# Patient Record
Sex: MALE | Race: WHITE | NOT HISPANIC OR LATINO | Employment: FULL TIME | URBAN - METROPOLITAN AREA
[De-identification: names, ages, dates, MRNs, and addresses within clinical notes are randomized per-mention and may not be internally consistent; named-entity substitution may affect disease eponyms.]

---

## 2018-10-31 ENCOUNTER — OFFICE VISIT - HEALTHEAST (OUTPATIENT)
Dept: FAMILY MEDICINE | Facility: CLINIC | Age: 23
End: 2018-10-31

## 2018-10-31 ENCOUNTER — COMMUNICATION - HEALTHEAST (OUTPATIENT)
Dept: TELEHEALTH | Facility: CLINIC | Age: 23
End: 2018-10-31

## 2018-10-31 DIAGNOSIS — M54.9 BACK PAIN: ICD-10-CM

## 2018-10-31 RX ORDER — NAPROXEN 500 MG/1
500 TABLET ORAL 2 TIMES DAILY WITH MEALS
Qty: 30 TABLET | Refills: 2 | Status: SHIPPED | OUTPATIENT
Start: 2018-10-31 | End: 2023-02-14

## 2018-11-06 ENCOUNTER — OFFICE VISIT - HEALTHEAST (OUTPATIENT)
Dept: FAMILY MEDICINE | Facility: CLINIC | Age: 23
End: 2018-11-06

## 2018-11-06 DIAGNOSIS — M62.830 SPASM OF THORACIC BACK MUSCLE: ICD-10-CM

## 2018-11-08 ENCOUNTER — OFFICE VISIT - HEALTHEAST (OUTPATIENT)
Dept: PHYSICAL THERAPY | Facility: REHABILITATION | Age: 23
End: 2018-11-08

## 2018-11-08 DIAGNOSIS — M54.6 ACUTE BILATERAL THORACIC BACK PAIN: ICD-10-CM

## 2018-11-14 ENCOUNTER — OFFICE VISIT - HEALTHEAST (OUTPATIENT)
Dept: PHYSICAL THERAPY | Facility: REHABILITATION | Age: 23
End: 2018-11-14

## 2018-11-14 DIAGNOSIS — M54.6 ACUTE BILATERAL THORACIC BACK PAIN: ICD-10-CM

## 2018-12-04 ENCOUNTER — OFFICE VISIT - HEALTHEAST (OUTPATIENT)
Dept: PHYSICAL THERAPY | Facility: REHABILITATION | Age: 23
End: 2018-12-04

## 2018-12-04 DIAGNOSIS — M54.6 ACUTE BILATERAL THORACIC BACK PAIN: ICD-10-CM

## 2018-12-07 ENCOUNTER — OFFICE VISIT - HEALTHEAST (OUTPATIENT)
Dept: PHYSICAL THERAPY | Facility: REHABILITATION | Age: 23
End: 2018-12-07

## 2018-12-07 DIAGNOSIS — M54.6 ACUTE BILATERAL THORACIC BACK PAIN: ICD-10-CM

## 2018-12-10 ENCOUNTER — OFFICE VISIT - HEALTHEAST (OUTPATIENT)
Dept: PHYSICAL THERAPY | Facility: REHABILITATION | Age: 23
End: 2018-12-10

## 2018-12-10 DIAGNOSIS — M54.6 ACUTE BILATERAL THORACIC BACK PAIN: ICD-10-CM

## 2018-12-18 ENCOUNTER — OFFICE VISIT - HEALTHEAST (OUTPATIENT)
Dept: PHYSICAL THERAPY | Facility: REHABILITATION | Age: 23
End: 2018-12-18

## 2018-12-18 DIAGNOSIS — M54.6 ACUTE BILATERAL THORACIC BACK PAIN: ICD-10-CM

## 2018-12-20 ENCOUNTER — OFFICE VISIT - HEALTHEAST (OUTPATIENT)
Dept: PHYSICAL THERAPY | Facility: REHABILITATION | Age: 23
End: 2018-12-20

## 2018-12-20 DIAGNOSIS — M54.6 ACUTE BILATERAL THORACIC BACK PAIN: ICD-10-CM

## 2021-06-02 VITALS — WEIGHT: 163.1 LBS

## 2021-06-02 VITALS — WEIGHT: 159.3 LBS

## 2021-06-21 NOTE — PROGRESS NOTES
Assessment/Plan:     1. Spasm of thoracic back muscle  No neurological symptoms.  Likely muscular.  Will refer to PT.  Continue Naproxen twice daily and Tizanidine four times a day as needed.   - Ambulatory referral to Adult PT- Internal  - tiZANidine (ZANAFLEX) 4 MG tablet; Take 1 tablet (4 mg total) by mouth every 6 (six) hours as needed.  Dispense: 30 tablet; Refill: 1        Subjective:     Amarjit Lin is a 23 y.o. male who presents for follow up regarding recent [facility] visit.  Patient was seen in [facility] 1 week ago with thoracic back pain.  Symptoms started about 6 weeks ago.  He recently started a new job at [employer].  The job is very physical.  Patient endorses muscle spasm and soreness to the bilateral thoracic back.  He gets intermittent sharp/pinching sensations.  Denies any pain at the actual spine.  No recent falls or injury.  He has recently been feeling pretty good in the morning, but muscle pain worsens as the day goes on.  No previous injury or back issues.  Patient was evaluated at walk-in [facility] and prescribed naproxen and tizanidine.  Symptoms have been better since last week.  He did see a physical therapist through his job about a month ago, and it was somewhat helpful.  He would be interested in seeing another physical therapist.  Patient denies any problems with urinary incontinence, radiation of pain down his legs, saddle anesthesia, fever, or numbness/tingling in the legs/feet.      The following portions of the patient's history were reviewed and updated as appropriate: allergies, current medications.    Review of Systems  A comprehensive review of systems was performed and was otherwise negative    Objective:     /72 (Patient Site: Right Arm, Patient Position: Sitting, Cuff Size: Adult Regular)  Pulse 78  Wt 163 lb 1.6 oz (74 kg)    General Appearance: Alert, cooperative, no distress, appears stated age  Back: Spine is straight and nontender. Mild tenderness to the bilateral  thoracic paraspinal muscles. LE strength equal bilaterally. SLR negative bilaterally. Patellar reflexes 2/4 bilaterally. Normal ROM.    Qing Carbajal, NP-C

## 2021-06-21 NOTE — PROGRESS NOTES
[facility]   Cervical Thoracic Initial Evaluation    Patient Name: Amarjit Lin  Date of evaluation: 11/8/2018  Referral Diagnosis: Spasm of thoracic back muscle  Referring provider: Qing Carbajal NP  Visit Diagnosis:     ICD-10-CM    1. Acute bilateral thoracic back pain M54.6        Assessment:      Impairments in  pain, posture, ROM, joint mobility, strength  Patient's signs and symptoms are consistent with medical diagnosis.  Patient responded well to manual therapy and exercises..  Prognosis to achieve goals is  good   Pt. is appropriate for skilled PT intervention as outlined in the Plan of Care (POC).    Goals:  Pt. will demonstrate/verbalize independence in self-management of condition in : 6 weeks  Pt. will be independent with home exercise program in : 6 weeks  Pt. will report decreased intensity, frequency of : Pain;in 6 weeks  Patient will stand : for work;with no pain;with less difficultty;in 6 weeks  Pt. will bend: for work;with less pain;with less difficulty;in 6 weeks    No Data Recorded    Patient's expectations/goals are realistic.    Barriers to Learning or Achieving Goals:  No Barriers.       Plan / Patient Instructions:        Plan of Care:   Communication with: Referral Source  Patient Related Instruction: Nature of Condition;Precautions;Next steps;Treatment plan and rationale;Expected outcome;Self Care instruction;Basis of treatment;Body mechanics;Posture  Times per Week: 2-1  Number of Weeks: 4-6  Number of Visits: 8  Discharge Planning: to include self management strategies and HEP  Precautions / Restrictions : none  Therapeutic Exercise: ROM;Stretching;Strengthening  Neuromuscular Reeducation: posture;core  Manual Therapy: soft tissue mobilization;myofascial release;joint mobilization  Modalities: cold pack;hot pack (trials as appropriate)      POC and pathology of condition were reviewed with patient.  Pt. is in agreement with the Plan of Care  A Home Exercise Program  (HEP) was initiated today.  Pt. was instructed in exercises by PT and patient was given a handout with detailed instructions.      Plan for next visit: review HEP, continue manual therapy, progress to core strengthening     Subjective:           History of Present Illness:    Amarjit is a 23 y.o. male who presents to therapy today with complaints of thoracolumbar back pain. Date of onset:  2018. Onset was gradual. Symptoms are intermittent. He denies history of similar symptoms. He describes their previous level of function as not limited.    Pain Ratin  Pain rating at best: 0  Pain rating at worst: 7  Pain description: aching, dull, pain and soreness    Functional limitations are described as occurring with:   bending  standing    walking           Objective:      Note: Items left blank indicates the item was not performed or not indicated at the time of the evaluation.    Patient Outcome Measures :    No Data Recorded   Scores range from 0-100%, where a score of 0% represents minimal pain and maximal function. The minmal clinically important difference is a score reduction of 10%.    Cervical Thoracic Examination  1. Acute bilateral thoracic back pain       Precautions/Restrictions: None  Involved side: Bilateral  Posture Observation:      Cervical:  Mild forward head  Shoulder/Thoracic complex: Moderate bilateral scapular protraction     Cervical ROM:    Date: 18     *Indicate scale AROM AROM AROM   Cervical Flexion WNL     Cervical Extension WNL      Right Left Right Left Right Left   Cervical Sidebending WNL WNL       Cervical Rotation WNL wNL       Cervical Protraction WNL     Cervical Retraction Min loss     Thoracic Flexion min     Thoracic Extension mod     Thoracic Sidebending mod mod       Thoracic Rotation min min         Strength     Date: 18     Cervical Myotomes/5 Right Left Right Left Right Left   Cervical Flexion (C1-2) 5 5       Cervical Sidebending (C3) 5 5       Shoulder Elevation  "(C4) 5 5       Shoulder Abduction (C5) 5 5       Elbow Flexion (C6) 5 5       Elbow Extension (C7) 5 5       Wrist Flexion (C7) 5 5       Wrist Extension (C6) 5 5       Thumb abduction (C8) 5 5       Finger Abduction (T1) 5 5         Sensation   NT      Reflex Testing  NT  Cervical Dermatomes Right Left UE Reflexes Right Left   Back of the Head (C2)   Biceps (C5-6)     Supraclavicular Fossa (C3)   Brachioradialis (C5-6)     AC Joint (C4)   Triceps (C7-8)     Lateral Biceps (C5)   Miles s test     Palmar Thumb (C6)   LE Reflexes     Palmar 3rd Finger (C7)   Patellar (L3-4)     Palmar 5th Finger (C8)   Achilles (S1-2)     Ulnar Forearm (T1)   Babinski Response           Palpation:  Tender bilateral thoracic paraspinals.    Passive Mobility-Joint Integrity: Hypomobile.    Cervical Special Tests      Cervical Special Tests Right Left UE Nerve Mobility Right Left   Cervical compression   Median nerve     Cervical distraction   Ulnar nerve     Spurling s test   Radial nerve     Shoulder abduction sign   Thoracic outlet     Deep neck flexor endurance test   Mraen     Upper cervical rotation   Adson s     Sharper-Maren   Cervical rotation lateral flexion     Alar ligament test   Other:     Other:   Other:           Treatment Today     Exercises:  Exercise #1: pec stretch  Comment #1: 30\" x 2   Exercise #2: thoracic extension   Comment #2: 10  Exercise #3: cervical retraction x 5  Comment #3: scapular retraction x 5    .   MFR layers 1-3 bilateral thoracic paraspinals, trapezius       TREATMENT MINUTES COMMENTS   Evaluation 25    Self-care/ Home management     Manual therapy 20    Neuromuscular Re-education     Therapeutic Activity     Therapeutic Exercises 10    Gait training     Modality__________________                Total 55    Blank areas are intentional and mean the treatment did not include these items.     PT Evaluation Code: (Please list factors)  Patient History/Comorbidities: There is no problem list on file " for this patient.   No past medical history on file.   Examination: as above  Clinical Presentation: stable  Clinical Decision Making: low    Patient History/  Comorbidities Examination  (body structures and functions, activity limitations, and/or participation restrictions) Clinical Presentation Clinical Decision Making (Complexity)   No documented Comorbidities or personal factors 1-2 Elements Stable and/or uncomplicated Low   1-2 documented comorbidities or personal factor 3 Elements Evolving clinical presentation with changing characteristics Moderate   3-4 documented comorbidities or personal factors 4 or more Unstable and unpredictable High               Armando Pryor, PT  11/8/2018  8:49 AM

## 2021-06-21 NOTE — PROGRESS NOTES
"[facility] Daily Progress     Patient Name: Amarjit Lin  Date: 11/14/2018  Visit #: 2/8  Referral Diagnosis:  Spasm of thoracic back muscle  Referring provider: Qing Carbajal NP  Visit Diagnosis:     ICD-10-CM    1. Acute bilateral thoracic back pain M54.6        Precautions / Restrictions : none       Assessment:     Response to Intervention:  Very good.  Significant improvement of symptoms overall.  Patient is ready for a trial of independent self management.    Symptoms are consistent with:  Medical diagnosis  Patient is appropriate to continue with skilled physical therapy intervention, as indicated by initial plan of care.    Goal Status:  Pt. will demonstrate/verbalize independence in self-management of condition in : 6 weeks  Pt. will be independent with home exercise program in : 6 weeks  Pt. will report decreased intensity, frequency of : Pain;in 6 weeks  Patient will stand : for work;with no pain;with less difficultty;in 6 weeks  Pt. will bend: for work;with less pain;with less difficulty;in 6 weeks    No Data Recorded  Other functional progress:           Plan / Patient Education:     Trial of independent self-management of condition initiated.  Patient to contact PT by phone or schedule an appointment as needed if symptoms increase or progress stops.  If patient has not returned to continue therapy in 30 days then physical therapy will be discharged.        Subjective:     Pain Rating:  Resting 0  Activity:  0    Response to last treatment: a little sore the next day, but better now  HEP- Frequency: 2x/day, Questions or difficulties:  none.    Patient reports:      Feeling a lot better.    Able to do household tasks with less pain and spams      Objective:            Manual Therapy  MFR layers 1-3 bilateral thoracic and lumbar paraspinals    Exercises:  Exercise #1: pec stretch  Comment #1: 30\" x 2   Exercise #2: thoracic extension   Comment #2: 10  Exercise #3: cervical retraction x " 5  Comment #3: scapular retraction x 5         Treatment Today    TREATMENT MINUTES COMMENTS   Evaluation     Self-care/ Home management     Manual therapy 25 See above.   Neuromuscular Re-education     Therapeutic Activity     Therapeutic Exercises 5 Verbal review of HEP   Gait training     Modality__________________                Total 30    Blank areas are intentional and mean the treatment did not include these items.       Armando Pryor, PT  11/14/2018

## 2021-06-22 NOTE — PROGRESS NOTES
"[facility] Daily Progress     Patient Name: Amarjit Lin  Date: 12/7/2018  Visit #: 4/8  Referral Diagnosis:  Spasm of thoracic back muscle  Referring provider: Qing Carbajal NP  Visit Diagnosis:     ICD-10-CM    1. Acute bilateral thoracic back pain M54.6        Precautions / Restrictions : none       Assessment:     Response to Intervention:  Very good.  Significant improvement of symptoms overall.      Symptoms are consistent with:  Medical diagnosis  Patient is appropriate to continue with skilled physical therapy intervention, as indicated by initial plan of care.    Goal Status:  Pt. will demonstrate/verbalize independence in self-management of condition in : 6 weeks  Pt. will be independent with home exercise program in : 6 weeks  Pt. will report decreased intensity, frequency of : Pain;in 6 weeks  Patient will stand : for work;with no pain;with less difficultty;in 6 weeks  Pt. will bend: for work;with less pain;with less difficulty;in 6 weeks    No Data Recorded  Other functional progress:           Plan / Patient Education:     Continue with POC       Subjective:     Pain Rating:  Resting 2  Activity:  3    Response to last treatment: a little sore the next day, but better now  HEP- Frequency: 2x/day, Questions or difficulties:  none.    Patient reports:      Feeling better, but still having some sharp pain.      Objective:            Manual Therapy  MFR layers 1-3 bilateral thoracic and lumbar paraspinals    Exercises:  Exercise #1: pec stretch  Comment #1: 30\" x 2   Exercise #2: thoracic extension   Comment #2: 10  Exercise #3: cervical retraction x 5  Comment #3: scapular retraction x 5  Exercise #4: manual stretching quad, hip flexors,   Comment #4: 30\" x 2 bilateral  Exercise #5: quad stretch  Comment #5: 30\"  Exercise #6: supine hamstring nerve glide  Comment #6: 10  Exercise #7: 1/2 kneel or supine hip flexor stretch for HEP  Exercise #8: piriformis stretch for HEP       Discussion " of srinivasa zhu janel chair      Treatment Today    TREATMENT MINUTES COMMENTS   Evaluation     Self-care/ Home management     Manual therapy 30    Neuromuscular Re-education     Therapeutic Activity     Therapeutic Exercises 5    Gait training     Modality__________________                Total 35    Blank areas are intentional and mean the treatment did not include these items.       Armando Pryor, PT  12/7/2018

## 2021-06-22 NOTE — PROGRESS NOTES
"[facility] Daily Progress     Patient Name: Amarjit Lin  Date: 12/10/2018  Visit #: 5/8  Referral Diagnosis:  Spasm of thoracic back muscle  Referring provider: Qing Carbajal NP  Visit Diagnosis:     ICD-10-CM    1. Acute bilateral thoracic back pain M54.6        Precautions / Restrictions : none       Assessment:     Response to Intervention:  Very good.  Significant improvement of symptoms overall.      Symptoms are consistent with:  Medical diagnosis  Patient is appropriate to continue with skilled physical therapy intervention, as indicated by initial plan of care.    Goal Status:  Pt. will demonstrate/verbalize independence in self-management of condition in : 6 weeks  Pt. will be independent with home exercise program in : 6 weeks  Pt. will report decreased intensity, frequency of : Pain;in 6 weeks  Patient will stand : for work;with no pain;with less difficultty;in 6 weeks  Pt. will bend: for work;with less pain;with less difficulty;in 6 weeks    No Data Recorded  Other functional progress:           Plan / Patient Education:     Continue with POC       Subjective:     Pain Rating:  Resting 2  Activity:  3    Response to last treatment: a little sore the next day, but better now  HEP- Frequency: 2x/day, Questions or difficulties:  none.    Patient reports:      No sharp pain.    Dull ache.    He plans on adding planks today.      Objective:            Manual Therapy  MFR layers 1-3 bilateral thoracic and lumbar paraspinals    Exercises:  Exercise #1: pec stretch  Comment #1: 30\" x 2   Exercise #2: thoracic extension   Comment #2: 10  Exercise #3: cervical retraction x 5  Comment #3: scapular retraction x 5  Exercise #4: manual stretching quad, hip flexors,   Comment #4: 30\" x 2 bilateral  Exercise #5: quad stretch  Comment #5: 30\"  Exercise #6: supine hamstring nerve glide  Comment #6: 10  Exercise #7: 1/2 kneel or supine hip flexor stretch for HEP  Exercise #8: piriformis stretch for HEP   "     Discussion of srinivasa zhu jaenl chair      Treatment Today    TREATMENT MINUTES COMMENTS   Evaluation     Self-care/ Home management     Manual therapy 25    Neuromuscular Re-education     Therapeutic Activity     Therapeutic Exercises     Gait training     Modality__________________                Total 25    Blank areas are intentional and mean the treatment did not include these items.       Armando Pryor, PT  12/10/2018

## 2021-06-22 NOTE — PROGRESS NOTES
[facility] Discharge Summary  Patient Name: Amarjit Lin  Date: 1/23/2019  Referral Diagnosis: Spasm of thoracic back muscle  Referring provider: Qing Carbajal NP  Visit Diagnosis:   1. Acute bilateral thoracic back pain         Goals:  Pt. will demonstrate/verbalize independence in self-management of condition in : 6 weeks  Pt. will be independent with home exercise program in : 6 weeks  Pt. will report decreased intensity, frequency of : Pain;in 6 weeks  Patient will stand : for work;with no pain;with less difficultty;in 6 weeks  Pt. will bend: for work;with less pain;with less difficulty;in 6 weeks    No Data Recorded    Patient was seen for 7 visits physical therapy.    The patient attended therapy initially, but did not finish the therapy sessions prescribed.  Goals were not fully achieved. Explanation for goals not achieved: The patient discontinued therapy, did not return.    Therapy will be discontinued at this time.  Please see progress note dated 12/20/18 for patient status.      Thank you for your referral.  Armando Pryor, PT  1/23/2019  12:18 PM         [facility] Daily Progress     Patient Name: Amarjit Lin  Date: 12/20/2018  Visit #: 7/8  Referral Diagnosis:  Spasm of thoracic back muscle  Referring provider: Qing Carbajal NP  Visit Diagnosis:     ICD-10-CM    1. Acute bilateral thoracic back pain M54.6        Precautions / Restrictions : none       Assessment:     Response to Intervention:  Very good.  Treatment of neck improving back.  Symptoms are consistent with:  Medical diagnosis  Patient is appropriate to continue with skilled physical therapy intervention, as indicated by initial plan of care.    Goal Status:  Pt. will demonstrate/verbalize independence in self-management of condition in : 6 weeks  Pt. will be independent with home exercise program in : 6 weeks  Pt. will report decreased intensity, frequency of : Pain;in 6 weeks  Patient will stand :  "for work;with no pain;with less difficultty;in 6 weeks  Pt. will bend: for work;with less pain;with less difficulty;in 6 weeks    No Data Recorded  Other functional progress:           Plan / Patient Education:     Continue with POC       Subjective:     Pain Rating:  Resting 2  Activity:  3    Response to last treatment: a little sore the next day, but better now  HEP- Frequency: 2x/day, Questions or difficulties:  none.    Patient reports:      Feeling a better, but still complains of deep back muscle soreness and stiffness..      Objective:          MFR layers 1-3 bilateral thoracic and lumbar paraspinals. Right: QL,       Exercises:  Exercise #1: pec stretch  Comment #1: 30\" x 2   Exercise #2: thoracic extension   Comment #2: 10  Exercise #3: cervical retraction x 5  Comment #3: scapular retraction x 5  Exercise #4: manual stretching quad, hip flexors,   Comment #4: 30\" x 2 bilateral  Exercise #5: quad stretch  Comment #5: 30\"  Exercise #6: supine hamstring nerve glide  Comment #6: 10  Exercise #7: 1/2 kneel or supine hip flexor stretch for HEP  Exercise #8: piriformis stretch for HEP             Treatment Today    TREATMENT MINUTES COMMENTS   Evaluation     Self-care/ Home management     Manual therapy 25    Neuromuscular Re-education     Therapeutic Activity     Therapeutic Exercises     Gait training     Modality__________________                Total 25    Blank areas are intentional and mean the treatment did not include these items.       Armando Pryor, PT  12/20/2018     "

## 2021-06-22 NOTE — PROGRESS NOTES
"[facility] Daily Progress     Patient Name: Amarjit Lin  Date: 12/18/2018  Visit #: 6/8  Referral Diagnosis:  Spasm of thoracic back muscle  Referring provider: Qing Carbajal NP  Visit Diagnosis:     ICD-10-CM    1. Acute bilateral thoracic back pain M54.6        Precautions / Restrictions : none       Assessment:     Response to Intervention:  Very good.  Treatment of neck improving back.  Symptoms are consistent with:  Medical diagnosis  Patient is appropriate to continue with skilled physical therapy intervention, as indicated by initial plan of care.    Goal Status:  Pt. will demonstrate/verbalize independence in self-management of condition in : 6 weeks  Pt. will be independent with home exercise program in : 6 weeks  Pt. will report decreased intensity, frequency of : Pain;in 6 weeks  Patient will stand : for work;with no pain;with less difficultty;in 6 weeks  Pt. will bend: for work;with less pain;with less difficulty;in 6 weeks    No Data Recorded  Other functional progress:           Plan / Patient Education:     Continue with POC       Subjective:     Pain Rating:  Resting 2  Activity:  3    Response to last treatment: a little sore the next day, but better now  HEP- Frequency: 2x/day, Questions or difficulties:  none.    Patient reports:      Exercises are going well.    Increased spasm 2 nights ago.      Objective:            Manual Therapy  MFR layers 1-3 bilateral thoracic and lumbar paraspinals  Manual therapy:  Neck    MFR layers 1-3 bilateral:  Suboccipitals, cervical extensors, cervical paraspinal rotators, scalenes.    Manual therapy:  Cervical longitudinal mobilization    Rate/grade Target  Direction  Relative movement Location in range Patient position   2 Cervical vertebrae Superior Distraction of facet joints Head in neutral Supine        Exercises:  Exercise #1: pec stretch  Comment #1: 30\" x 2   Exercise #2: thoracic extension   Comment #2: 10  Exercise #3: cervical " "retraction x 5  Comment #3: scapular retraction x 5  Exercise #4: manual stretching quad, hip flexors,   Comment #4: 30\" x 2 bilateral  Exercise #5: quad stretch  Comment #5: 30\"  Exercise #6: supine hamstring nerve glide  Comment #6: 10  Exercise #7: 1/2 kneel or supine hip flexor stretch for HEP  Exercise #8: piriformis stretch for HEP             Treatment Today    TREATMENT MINUTES COMMENTS   Evaluation     Self-care/ Home management     Manual therapy 25    Neuromuscular Re-education     Therapeutic Activity     Therapeutic Exercises     Gait training     Modality__________________                Total 25    Blank areas are intentional and mean the treatment did not include these items.       Armando Pryor, PT  12/18/2018     "

## 2021-06-22 NOTE — PROGRESS NOTES
"[facility] Daily Progress     Patient Name: Amarjit Lin  Date: 12/4/2018  Visit #: 3/8  Referral Diagnosis:  Spasm of thoracic back muscle  Referring provider: Qing Carbajal NP  Visit Diagnosis:     ICD-10-CM    1. Acute bilateral thoracic back pain M54.6        Precautions / Restrictions : none       Assessment:     Response to Intervention:  Very good.  Significant improvement of symptoms overall.      Symptoms are consistent with:  Medical diagnosis  Patient is appropriate to continue with skilled physical therapy intervention, as indicated by initial plan of care.    Goal Status:  Pt. will demonstrate/verbalize independence in self-management of condition in : 6 weeks  Pt. will be independent with home exercise program in : 6 weeks  Pt. will report decreased intensity, frequency of : Pain;in 6 weeks  Patient will stand : for work;with no pain;with less difficultty;in 6 weeks  Pt. will bend: for work;with less pain;with less difficulty;in 6 weeks    No Data Recorded  Other functional progress:           Plan / Patient Education:     Continue with POC       Subjective:     Pain Rating:  Resting 2  Activity:  3    Response to last treatment: a little sore the next day, but better now  HEP- Frequency: 2x/day, Questions or difficulties:  none.    Patient reports:      Better in am.    Pm it feels \"dry\" \"grinding pain\".  It feels like it swells up with more activity and is worse in the evening.      Objective:            Manual Therapy  MFR layers 1-3 bilateral thoracic and lumbar paraspinals    Exercises:  Exercise #1: pec stretch  Comment #1: 30\" x 2   Exercise #2: thoracic extension   Comment #2: 10  Exercise #3: cervical retraction x 5  Comment #3: scapular retraction x 5  Exercise #4: manual stretching quad, hip flexors,   Comment #4: 30\" x 2 bilateral  Exercise #5: quad stretch  Comment #5: 30\"  Exercise #6: supine hamstring nerve glide  Comment #6: 10  Exercise #7: 1/2 kneel or supine hip " flexor stretch for HEP  Exercise #8: piriformis stretch for HEP         Treatment Today    TREATMENT MINUTES COMMENTS   Evaluation     Self-care/ Home management     Manual therapy 22 See above.   Neuromuscular Re-education     Therapeutic Activity     Therapeutic Exercises 10 Verbal review of HEP   Gait training     Modality__________________                Total 32    Blank areas are intentional and mean the treatment did not include these items.       Armando Pryor, PT  12/4/2018

## 2021-06-26 NOTE — PROGRESS NOTES
Progress Notes by Jamee Kaiser CNP at 10/31/2018 12:20 PM     Author: Jamee Kaiser CNP Service: -- Author Type: Nurse Practitioner    Filed: 10/31/2018  1:32 PM Encounter Date: 10/31/2018 Status: Signed    : Jamee Kaiser CNP (Nurse Practitioner)       ASSESSMENT:   1. Back pain  tiZANidine (ZANAFLEX) 4 MG tablet    naproxen (NAPROSYN) 500 MG tablet       PLAN:  Amarjit Lin is a 23 y.o. male who presents to the clinic today for evaluation of back pain.    Differential diagnosis for back pain includes muscle spasm/strain, slipped disc w/ radicular pain including sciatica, slipped disc w/ cauda equina syndrome,vertebral fracture, vertebral tumor, epidural abscess / discitis, or pyelonephritis.      I do not believe a fracture to be the source of this patient's pain as there was no preceding trauma.  Based on no trauma, no imaging of the spine was done.      I do not believe the pain is caused by an epidural abscess as the patient denies hx of IV drug use and does not have fevers/chills and no recent procedures.      I do not believe this patient's pain is from an infiltrative vertebral tumor as the patient does not have weight loss or night sweats and no known history of cancer.      I do not believe this patient's pain represents a rupture AAA.  There is no palpable, pulsatile mass on exam and patient does not have any ABD pain.      Patient do not have urinary symptoms or CVA tenderness to suggest pyelonephritis.      Based on history and exam, the most likely etiology of this patient's back pain is muscle spasm/strain.  Emergent MRI is not indicated as this patient does not have new weakness or cauda equina syndrome.  Patient has no bowel or bladder incontinence. Will treat today with zanaflex and naproxen, patient to follow up with PCP if no improvement over the next 5-7 days.  Will seek emergency care with new weakness/paresthesias, loss of continence, fever or worsening  symptoms.      I discussed red flag symptoms, return precautions to clinic/ER and follow up care with patient/parent.  Expected clinical course, symptomatic cares advised. Questions answered. Patient/parent amenable with plan.    Patient Instructions:  Patient Instructions   Your back pain is likely due to a muscular strain.     Take naproxen twice daily for the next 5 days, then as needed for pain.    You may use the Zanaflex as needed for muscle spasm. Use caution while taking this medication, as it can make you drowsy. Do not take while driving, operating heavy machinery, or doing any activities requiring intense concentration.    Try using a heating pad and/or warm baths.    Make sure to keep moving to avoid getting stiff. See below for stretching exercises.    If you develop fever, severe pain that prevents you from walking at all, weakness of your arms or legs, loss of bowel or bladder continence, or any other new concerning symptoms, go to the ER immediately.    Otherwise, follow up with primary care doctor as needed or if no improvement in pain in symptoms in 1 week.            Ibuprofen/Naproxen Discharge Instructions:  You have been prescribed Naproxen for pain control.  The maximum dose of   naproxen is 1100 mg in a 24-hour period.    Take this medication with food to prevent stomach irritation.  With long-term use this medication can irritate the stomach causing pain and lead to development of a stomach ulcer.  If you notice stomach pain or vomiting of coffee-ground colored vomit or blood, please be seen by a healthcare provider.  Attempt to use this medication for the shortest time possible.            SUBJECTIVE:   Amarjit Lin is a 23 y.o. male who presents today with intermittent mid back pain for the past month and a half.  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  Notes about a month and half ago he began having muscle spasms to his middle back.  He was  working with a physical therapist through his employer with [employer].  He then saw a chiropractor, however he felt pressured by the chiropractor to sign a contract and discontinued seeing the chiropractor.  He is taking a few ibuprofen intermittently throughout the course of the pain, he does experience good relief of the pain when he does take the medication, however he is not using this consistently.  Denies any recent injury.  Denies any recent procedures.  Denies IV drug use.  He denies paresthesias, weakness, fevers, chills, abdominal pain, dysuria, saddle anesthesia, loss of bowel or bladder continence.      ROS:  Comprehensive 12 pt ROS completed, positives noted in HPI, otherwise negative.      Past Medical History:  There is no problem list on file for this patient.  Denies chronic health problems.    Surgical History:  No past surgical history on file.    Denies.    Family History:  No family history on file.    Reviewed; Non-contributory    History   Smoking Status   ? Former Smoker   ? Types: Cigarettes   ? Quit date: 2/1/2018   Smokeless Tobacco   ? Never Used       Smoking: Denies  Alcohol: Occasional  Recreational Drugs: Denies  Occupation: Works in a [employer]        Current Medications:  No current outpatient prescriptions on file prior to visit.     No current facility-administered medications on file prior to visit.        Allergies:   No Known Allergies    OBJECTIVE:   Vitals:    10/31/18 1235   BP: 104/70   Patient Site: Right Arm   Patient Position: Sitting   Cuff Size: Adult Regular   Pulse: 83   Resp: 14   Temp: 98.1  F (36.7  C)   TempSrc: Oral   SpO2: 98%   Weight: 159 lb 4.8 oz (72.3 kg)     Physical exam reveals a pleasant 23 y.o. male.   General Appearance:  Alert, cooperative, no distress, appears stated age. Afebrile. Appears comfortable sitting in chair. Rises from seated position without difficulty.  Integument: Warm, dry, no rashes or lesions.  HEENT:  Atraumatic, normocephalic. Face nontraumatic. Conjunctiva clear, Lids normal.  Neck: Supple, no meningismus. No Cspine tenderness. Neck ROM intact.  Respiratory: No distress. Lungs clear to ausculation bilaterally. No crackles, wheezes, rhonchi or stridor.  Cardiovascular: Regular rate and rhythm, no murmur, rub or gallop. No obvious chest wall deformities.  Abdomen: soft, nontender, non-distended. No masses. No CVAT.  Musculoskeletal: Back: No Lspine or Tspine tenderness or crepitus to palpation. Mild TTP thoracic paraspinal musculature. Strength testing: hip flexion, extension 5/5 bilaterally, knee flexion/extension 5/5 bilaterally, ankle plantar/dorsiflexion 5/5 bilaterally.  Straight leg raise negative. Gait: observed, no antalgia or abnormalities. Steady gait. 2+ bilateral pedal pulses.  Normal toe raise, heel walk. Normal flexion and extension of toes.  Neurologic: Alert and orientated appropriately. No focal deficits. Sensation intact in distal LEs. DTRs: patellar 2+ bilaterally, achilles 2+ bilaterally.  Psych: Normal mood and affect.         RADIOLOGY    none  LABORATORY STUDIES    none      Jamee Kaiser, CNP

## 2021-08-21 ENCOUNTER — HEALTH MAINTENANCE LETTER (OUTPATIENT)
Age: 26
End: 2021-08-21

## 2021-10-16 ENCOUNTER — HEALTH MAINTENANCE LETTER (OUTPATIENT)
Age: 26
End: 2021-10-16

## 2022-10-01 ENCOUNTER — HEALTH MAINTENANCE LETTER (OUTPATIENT)
Age: 27
End: 2022-10-01

## 2023-02-14 ENCOUNTER — VIRTUAL VISIT (OUTPATIENT)
Dept: FAMILY MEDICINE | Facility: CLINIC | Age: 28
End: 2023-02-14
Payer: COMMERCIAL

## 2023-02-14 DIAGNOSIS — R07.89 CHEST WALL PAIN: ICD-10-CM

## 2023-02-14 DIAGNOSIS — M54.6 ACUTE BILATERAL THORACIC BACK PAIN: ICD-10-CM

## 2023-02-14 DIAGNOSIS — R00.2 PALPITATIONS: Primary | ICD-10-CM

## 2023-02-14 DIAGNOSIS — F51.01 PRIMARY INSOMNIA: ICD-10-CM

## 2023-02-14 DIAGNOSIS — R79.89 ELEVATED SERUM CREATININE: ICD-10-CM

## 2023-02-14 DIAGNOSIS — F41.0 ANXIETY ATTACK: ICD-10-CM

## 2023-02-14 PROCEDURE — 99204 OFFICE O/P NEW MOD 45 MIN: CPT | Mod: VID | Performed by: NURSE PRACTITIONER

## 2023-02-14 RX ORDER — PROPRANOLOL HYDROCHLORIDE 20 MG/1
20 TABLET ORAL 3 TIMES DAILY PRN
Qty: 60 TABLET | Refills: 0 | Status: SHIPPED | OUTPATIENT
Start: 2023-02-14 | End: 2023-03-08

## 2023-02-14 RX ORDER — TRAZODONE HYDROCHLORIDE 50 MG/1
50-100 TABLET, FILM COATED ORAL AT BEDTIME
Qty: 30 TABLET | Refills: 0 | Status: SHIPPED | OUTPATIENT
Start: 2023-02-14 | End: 2023-03-08

## 2023-02-14 ASSESSMENT — ENCOUNTER SYMPTOMS: NERVOUS/ANXIOUS: 1

## 2023-02-14 ASSESSMENT — ANXIETY QUESTIONNAIRES
2. NOT BEING ABLE TO STOP OR CONTROL WORRYING: MORE THAN HALF THE DAYS
7. FEELING AFRAID AS IF SOMETHING AWFUL MIGHT HAPPEN: MORE THAN HALF THE DAYS
7. FEELING AFRAID AS IF SOMETHING AWFUL MIGHT HAPPEN: MORE THAN HALF THE DAYS
8. IF YOU CHECKED OFF ANY PROBLEMS, HOW DIFFICULT HAVE THESE MADE IT FOR YOU TO DO YOUR WORK, TAKE CARE OF THINGS AT HOME, OR GET ALONG WITH OTHER PEOPLE?: VERY DIFFICULT
6. BECOMING EASILY ANNOYED OR IRRITABLE: NOT AT ALL
4. TROUBLE RELAXING: MORE THAN HALF THE DAYS
5. BEING SO RESTLESS THAT IT IS HARD TO SIT STILL: SEVERAL DAYS
GAD7 TOTAL SCORE: 11
GAD7 TOTAL SCORE: 11
3. WORRYING TOO MUCH ABOUT DIFFERENT THINGS: MORE THAN HALF THE DAYS
1. FEELING NERVOUS, ANXIOUS, OR ON EDGE: MORE THAN HALF THE DAYS
GAD7 TOTAL SCORE: 11
IF YOU CHECKED OFF ANY PROBLEMS ON THIS QUESTIONNAIRE, HOW DIFFICULT HAVE THESE PROBLEMS MADE IT FOR YOU TO DO YOUR WORK, TAKE CARE OF THINGS AT HOME, OR GET ALONG WITH OTHER PEOPLE: VERY DIFFICULT

## 2023-02-14 NOTE — PROGRESS NOTES
Amarjit is a 28 year old who is being evaluated via a billable video visit.      How would you like to obtain your AVS? MyChart  If the video visit is dropped, the invitation should be resent by: Send to e-mail at: [email or web address]  Will anyone else be joining your video visit? No          Assessment & Plan     Palpitations  Persistent palpitations, especially at night.  Would be reasonable to rule out SVT with a Zio Patch and we will also check for lab abnormalities.  Also having chest wall pain, will check inflammatory markers to rule out endocarditis or and consider echo pending the results.  There is likely an anxiety component and he was agreeable to a trial of propanolol to see if this helps his symptoms.  We did discuss low dose selective serotonin reuptake inhibitor but he would rather try CBT before a daily medication.    - propranolol (INDERAL) 20 MG tablet; Take 1 tablet (20 mg) by mouth 3 times daily as needed (anxiety)  - Adult Leadless EKG Monitor 3 to 7 Days; Future  - TSH with free T4 reflex; Future  - Comprehensive metabolic panel (BMP + Alb, Alk Phos, ALT, AST, Total. Bili, TP); Future  - CBC with platelets; Future  - ESR: Erythrocyte sedimentation rate; Future  - CRP, inflammation; Future    Chest wall pain  - ESR: Erythrocyte sedimentation rate; Future  - CRP, inflammation; Future    Acute bilateral thoracic back pain  - ESR: Erythrocyte sedimentation rate; Future  - CRP, inflammation; Future    Anxiety attack  See above.    - propranolol (INDERAL) 20 MG tablet; Take 1 tablet (20 mg) by mouth 3 times daily as needed (anxiety)  - Adult Mental Health  Referral; Future    Primary insomnia  Will prescribe trazodone to see if we can break the insomnia cycle.  Can also take propranolol at bedtime to help with palpitations.  He denies feeling anxious when he goes to bed, it is the palpitations that are contributing to the anxiety.    - traZODone (DESYREL) 50 MG tablet; Take 1-2 tablets  ( mg) by mouth At Bedtime    Ordering of each unique test  Prescription drug management             No follow-ups on file.    DAVIDA Nunez CNP  [facility]    Elle Ocasio is a 28 year old, presenting for the following health issues:  Anxiety      Anxiety    History of Present Illness       Back Pain:  He presents for follow up of back pain. Patient's back pain is a new problem.    Original cause of back pain: turning/bending  First noticed back pain: in the last week  Patient feels back pain: constantlyLocation of back pain:  Other  Description of back pain: dull ache and fullness  Back pain spreads: right shoulder, left shoulder, right side of neck and left side of neck    Since patient first noticed back pain, pain is: always present, but gets better and worse  Does back pain interfere with his job:  Yes  On a scale of 1-10 (10 being the worst), patient describes pain as:  5  What makes back pain worse: bending, certain positions, lying down, sitting and stress  Acupuncture: not tried  Acetaminophen: not tried  Activity or exercise: not tried  Chiropractor:  Not tried  Heat: helpful  Massage: not tried  Muscle relaxants: not tried  NSAIDS: not tried  Opioids: not tried  Physical Therapy: not tried  Rest: not tried  Steroid Injection: not tried  Stretching: helpful  Surgery: not tried  TENS unit: not tried  Other healthcare providers patient is seeing for back pain: None    Mental Health Follow-up:  Patient presents to follow-up on Anxiety.    Patient's anxiety since last visit has been:  Worse  The patient is having other symptoms associated with anxiety.  Any significant life events: job concerns  Patient is feeling anxious or having panic attacks.  Patient has no concerns about alcohol or drug use.    He eats 2-3 servings of fruits and vegetables daily.He consumes 0 sweetened beverage(s) daily.He exercises with enough effort to increase his heart rate 20 to 29  minutes per day.  He exercises with enough effort to increase his heart rate 4 days per week.   He is taking medications regularly.  Today's JOE-7 Score: 11       Having a lot of trouble sleeping for a week.  Having some pain the anterior chest wall and between shoulder blades.    Jerking awake feeling really anxious and having heart flutters that last for hours.  Not necessarily feeling anxious when going to bed, this feels different but certainly contributes to anxiety when it starts to happen.  If he sits up, symptoms improve but this has been happening nightly over the last week and lack of sleep is starting to affect him.  He notes the chest pain is around the mid sternum, if he pushes on it, pain is increased.  Feels like a pulled muscle in mid back between the shoulder blades.        Mild baseline social anxiety and work anxiety.  Had a job change in the last 6 months, more responsibilities and stress related to that.  Felt he could usually get rid of the social anxiety  About 30 minutes after being somewhere.  Usually associated with palpitations, but the ones occurring at night are more persistent than the palpitations associated with anxiety.      Not having any dizziness, dyspnea, or diaphoresis associated with it.  No recent illness.    Review of Systems   Psychiatric/Behavioral: The patient is nervous/anxious.       Constitutional, HEENT, cardiovascular, pulmonary, gi and gu systems are negative, except as otherwise noted.      Objective           Vitals:  No vitals were obtained today due to virtual visit.    Physical Exam   GENERAL: Healthy, alert and no distress  EYES: Eyes grossly normal to inspection.  No discharge or erythema, or obvious scleral/conjunctival abnormalities.  RESP: No audible wheeze, cough, or visible cyanosis.  No visible retractions or increased work of breathing.    SKIN: Visible skin clear. No significant rash, abnormal pigmentation or lesions.  NEURO: Cranial nerves grossly  intact.  Mentation and speech appropriate for age.  PSYCH: Mentation appears normal, affect normal/bright, judgement and insight intact, normal speech and appearance well-groomed.                Video-Visit Details    Type of service:  Video Visit     Originating Location (pt. Location): Home    Distant Location (provider location):  Off-site  Platform used for Video Visit: Fluther

## 2023-02-15 ENCOUNTER — LAB (OUTPATIENT)
Dept: LAB | Facility: CLINIC | Age: 28
End: 2023-02-15
Payer: COMMERCIAL

## 2023-02-15 DIAGNOSIS — R07.89 CHEST WALL PAIN: ICD-10-CM

## 2023-02-15 DIAGNOSIS — M54.6 ACUTE BILATERAL THORACIC BACK PAIN: ICD-10-CM

## 2023-02-15 DIAGNOSIS — R00.2 PALPITATIONS: ICD-10-CM

## 2023-02-15 LAB
ALBUMIN SERPL BCG-MCNC: 4.2 G/DL (ref 3.5–5.2)
ALP SERPL-CCNC: 36 U/L (ref 40–129)
ALT SERPL W P-5'-P-CCNC: 24 U/L (ref 10–50)
ANION GAP SERPL CALCULATED.3IONS-SCNC: 11 MMOL/L (ref 7–15)
AST SERPL W P-5'-P-CCNC: 22 U/L (ref 10–50)
BILIRUB SERPL-MCNC: 0.2 MG/DL
BUN SERPL-MCNC: 12.7 MG/DL (ref 6–20)
CALCIUM SERPL-MCNC: 8.7 MG/DL (ref 8.6–10)
CHLORIDE SERPL-SCNC: 104 MMOL/L (ref 98–107)
CREAT SERPL-MCNC: 1.19 MG/DL (ref 0.67–1.17)
CRP SERPL-MCNC: <3 MG/L
DEPRECATED HCO3 PLAS-SCNC: 25 MMOL/L (ref 22–29)
ERYTHROCYTE [DISTWIDTH] IN BLOOD BY AUTOMATED COUNT: 12.2 % (ref 10–15)
ERYTHROCYTE [SEDIMENTATION RATE] IN BLOOD BY WESTERGREN METHOD: 7 MM/HR (ref 0–15)
GFR SERPL CREATININE-BSD FRML MDRD: 84 ML/MIN/1.73M2
GLUCOSE SERPL-MCNC: 105 MG/DL (ref 70–99)
HCT VFR BLD AUTO: 40.2 % (ref 40–53)
HGB BLD-MCNC: 14 G/DL (ref 13.3–17.7)
MCH RBC QN AUTO: 30.2 PG (ref 26.5–33)
MCHC RBC AUTO-ENTMCNC: 34.8 G/DL (ref 31.5–36.5)
MCV RBC AUTO: 87 FL (ref 78–100)
PLATELET # BLD AUTO: 246 10E3/UL (ref 150–450)
POTASSIUM SERPL-SCNC: 4.3 MMOL/L (ref 3.4–5.3)
PROT SERPL-MCNC: 6.7 G/DL (ref 6.4–8.3)
RBC # BLD AUTO: 4.64 10E6/UL (ref 4.4–5.9)
SODIUM SERPL-SCNC: 140 MMOL/L (ref 136–145)
TSH SERPL DL<=0.005 MIU/L-ACNC: 3.47 UIU/ML (ref 0.3–4.2)
WBC # BLD AUTO: 4.5 10E3/UL (ref 4–11)

## 2023-02-15 PROCEDURE — 84443 ASSAY THYROID STIM HORMONE: CPT

## 2023-02-15 PROCEDURE — 80053 COMPREHEN METABOLIC PANEL: CPT

## 2023-02-15 PROCEDURE — 86140 C-REACTIVE PROTEIN: CPT

## 2023-02-15 PROCEDURE — 85027 COMPLETE CBC AUTOMATED: CPT

## 2023-02-15 PROCEDURE — 36415 COLL VENOUS BLD VENIPUNCTURE: CPT

## 2023-02-15 PROCEDURE — 85652 RBC SED RATE AUTOMATED: CPT

## 2023-02-16 ENCOUNTER — HOSPITAL ENCOUNTER (OUTPATIENT)
Dept: CARDIOLOGY | Facility: CLINIC | Age: 28
Discharge: HOME | End: 2023-02-16
Attending: NURSE PRACTITIONER | Admitting: NURSE PRACTITIONER
Payer: COMMERCIAL

## 2023-02-16 DIAGNOSIS — R00.2 PALPITATIONS: ICD-10-CM

## 2023-02-16 PROCEDURE — 93242 EXT ECG>48HR<7D RECORDING: CPT

## 2023-02-16 PROCEDURE — 93248 EXT ECG>7D<15D REV&INTERPJ: CPT | Performed by: INTERNAL MEDICINE

## 2023-02-20 ENCOUNTER — ANCILLARY PROCEDURE (OUTPATIENT)
Dept: GENERAL RADIOLOGY | Facility: CLINIC | Age: 28
End: 2023-02-20
Attending: PHYSICIAN ASSISTANT
Payer: COMMERCIAL

## 2023-02-20 ENCOUNTER — OFFICE VISIT (OUTPATIENT)
Dept: FAMILY MEDICINE | Facility: CLINIC | Age: 28
End: 2023-02-20
Payer: COMMERCIAL

## 2023-02-20 VITALS
HEART RATE: 71 BPM | TEMPERATURE: 98.2 F | DIASTOLIC BLOOD PRESSURE: 87 MMHG | WEIGHT: 188 LBS | SYSTOLIC BLOOD PRESSURE: 136 MMHG | RESPIRATION RATE: 14 BRPM

## 2023-02-20 DIAGNOSIS — R06.02 SOB (SHORTNESS OF BREATH): Primary | ICD-10-CM

## 2023-02-20 DIAGNOSIS — R06.02 SOB (SHORTNESS OF BREATH): ICD-10-CM

## 2023-02-20 PROCEDURE — 71046 X-RAY EXAM CHEST 2 VIEWS: CPT | Mod: TC | Performed by: RADIOLOGY

## 2023-02-20 PROCEDURE — 99204 OFFICE O/P NEW MOD 45 MIN: CPT | Performed by: PHYSICIAN ASSISTANT

## 2023-02-20 ASSESSMENT — PAIN SCALES - GENERAL: PAINLEVEL: NO PAIN (0)

## 2023-02-20 NOTE — PROGRESS NOTES
Chief Complaint   Patient presents with     Breathing Problem     SOB. X 1 week . Hard to get air in and out     Rectal Bleeding     And stomach spasm. Blood in BM this am.     Insomnia     Fatigue       ASSESSMENT/PLAN:  Amarjit was seen today for breathing problem, rectal bleeding, insomnia and fatigue.    Diagnoses and all orders for this visit:    SOB (shortness of breath)  -     XR Chest 2 Views; Future    Considered a broad differential diagnosis including PE, pneumothorax, pneumonia, MI, cardiac arrhythmia, anxiety, anemia, viral infection among others    Overall patient appears well today.  X-ray within normal limits.  Currently on Zio patch so not doing an EKG today.  7 days ago had labs and there is no evidence of anemia.  Patient currently on iron pills as well.  The rectal bleeding likely hemorrhoid as he had these before.  Conservative management recommended for this.  Vitals within normal limits and exam is reassuring.    Think most likely patient is experiencing anxiety related symptoms.  Continue to encourage him to seek CBT and did discuss medications briefly.  Recommend he schedule an appointment with his PCP in 1 to 2 weeks to recheck symptoms.  If he feels better he can always cancel.  Discussed some meditation and other breathing techniques to improve symptoms.  Continue self-care, hydration, healthy diet, exercise and sleep hygiene.  The insomnia is likely contributing to the symptoms as well.  Unfortunately trazodone was not as effective as hoped for.    Prashant Vidales PA-C  35 minutes spent on the date of the encounter doing chart review, history and exam, documentation and further activities per the note    SUBJECTIVE:  Amarjit is a 28 year old male who presents to urgent care with concerns for shortness of breath, insomnia, fatigue, anxiety and rectal bleeding.  He has been dealing with some palpitations for few weeks and was seen by his PCP a week ago.  They did draw labs, placed on Zio  patch and given a referral to mental health.  He was placed on trazodone for insomnia.  CBT recommendations for anxiety.  Overall his labs look good.  Since then he has been dealing with some shortness of breath has been more persistent.  Feels like he cannot get a good breath in.  Seems to be a little bit worse with exercise but has been better with exercise as well.  Trazodone made him groggy and did not help him sleep as much as he would hope.  Has been doing with more stress at work.  Palpitations are still present and does spark some anxiety for him.  No significant chest pain.  No lightheadedness.  No nausea, vomiting, diarrhea dumping.  He has some gurgling in the right lower quadrant and did have a stool with bright red blood today.  No diarrhea or recent constipation.  No family history of colon cancer, both grandparents had heart attacks in their 60s  ROS: Pertinent ROS neg other than the symptoms noted above in the HPI.     OBJECTIVE:  /87   Pulse 71   Temp 98.2  F (36.8  C) (Oral)   Resp 14   Wt 85.3 kg (188 lb)    GENERAL: healthy, alert and no distress  EYES: Eyes grossly normal to inspection, PERRL and conjunctivae and sclerae normal  HENT: ear canals and TM's normal, nose and mouth without ulcers or lesions  RESP: lungs clear to auscultation - no rales, rhonchi or wheezes  CV: regular rate and rhythm, normal S1 S2, no S3 or S4, no murmur, click or rub, no peripheral edema and peripheral pulses strong  MS: no gross musculoskeletal defects noted, no edema  SKIN: no suspicious lesions or rashes  NEURO: Normal strength and tone, mentation intact and speech normal  PSYCH: mentation appears normal, affect normal/bright    DIAGNOSTICS  Xray - Reviewed and interpreted by me.  No acute cardiopulmonary abnormality noted  Results for orders placed or performed in visit on 02/20/23   XR Chest 2 Views     Status: None    Narrative    EXAM: XR CHEST 2 VIEWS  LOCATION: [facility]  [place]  DATE/TIME: 2023 1:14 PM    INDICATION: Shortness of breath.  COMPARISON: None.      Impression    IMPRESSION: Exclusion of the lung apices from view. Lungs are clear. No pleural effusion or pneumothorax. Normal heart size.          Current Outpatient Medications   Medication     propranolol (INDERAL) 20 MG tablet     traZODone (DESYREL) 50 MG tablet     No current facility-administered medications for this visit.      There is no problem list on file for this patient.     No past medical history on file.  No past surgical history on file.  No family history on file.  Social History     Tobacco Use     Smoking status: Former     Types: Cigarettes     Quit date: 2018     Years since quittin.0     Smokeless tobacco: Never   Substance Use Topics     Alcohol use: Not on file              The plan of care was discussed with the patient. They understand and agree with the course of treatment prescribed. A printed summary was given including instructions and medications.  The use of Dragon/[employer] dictation services may have been used to construct the content in this note; any grammatical or spelling errors are non-intentional. Please contact the author of this note directly if you are in need of any clarification.

## 2023-02-21 ENCOUNTER — VIRTUAL VISIT (OUTPATIENT)
Dept: FAMILY MEDICINE | Facility: CLINIC | Age: 28
End: 2023-02-21
Payer: COMMERCIAL

## 2023-02-21 DIAGNOSIS — F41.1 GENERALIZED ANXIETY DISORDER: Primary | ICD-10-CM

## 2023-02-21 PROCEDURE — 99213 OFFICE O/P EST LOW 20 MIN: CPT | Mod: VID

## 2023-02-21 RX ORDER — ESCITALOPRAM OXALATE 10 MG/1
10 TABLET ORAL DAILY
Qty: 30 TABLET | Refills: 1 | Status: SHIPPED | OUTPATIENT
Start: 2023-02-21 | End: 2023-03-31

## 2023-02-21 RX ORDER — HYDROXYZINE PAMOATE 25 MG/1
50 CAPSULE ORAL 3 TIMES DAILY PRN
Qty: 60 CAPSULE | Refills: 1 | Status: SHIPPED | OUTPATIENT
Start: 2023-02-21 | End: 2023-03-31

## 2023-02-21 NOTE — PATIENT INSTRUCTIONS
Start taking escitalopram 10mg. Monitor for side effects that do not go away after 2-3 weeks, such as headache, nausea/diarrhea. Can take in the morning or night, depending on how you tolerate it.  Keep in mind this takes anywhere from 4-6 weeks to reach optimal effectiveness. As this has been optimized, you should need less of the things like propranolol and hydroxyzine.  You can try hydroxyzine 25-50mg at night to see if it will help. Has similar side effects as trazodone, but are in different medication classes.  Look into sleep meditation. Try to avoid screens 2 hours before bed. Try to avoid doing anything else in your bedroom (TV, work, exercise) to make it a safe haven for sleep time.  Keep your appointment with the therapist on Monday.  Follow up with an e-visit to discuss how things have been going or sooner if needed.

## 2023-02-21 NOTE — PROGRESS NOTES
Amarjit is a 28 year old who is being evaluated via a billable video visit.      How would you like to obtain your AVS? MyChart  If the video visit is dropped, the invitation should be resent by: Text to cell phone: [phone]  Will anyone else be joining your video visit? No      Assessment & Plan   Problem List Items Addressed This Visit        Behavioral    Generalized anxiety disorder - Primary     Patient sought care today for ongoing concerns of anxiety.  He has been seen twice for physical manifestations of significant anxiety and had thorough work-ups completed.  He does report that some of the interventions prescribed have been helpful, including the trazodone and propranolol, but he is still struggling.  Today, we discussed next steps.  He is amenable to initiating SSRI therapy.  Will start with escitalopram 10 mg daily and titrate upwards as needed.  Expected therapeutic effects and potential side effects discussed with patient.  He will plan on following up with me via an E-visit in 4 weeks to discuss changes to his medications at that time.  Of note, he is on low-dose trazodone, which carries the potential for interaction (serotonin syndrome) with his escitalopram, but we will plan to monitor closely and both are low-dose. We did discuss other as needed anxiety medications today, including benzodiazepines versus hydroxyzine.  Patient would like to avoid benzodiazepines which I am in agreement with.  We will trial 25 to 50 mg of hydroxyzine as needed for anxiety symptoms before bed.  Discussed potential interaction with trazodone.  I encouraged him to keep his appointment on Monday with counseling, as I believe this will be very beneficial.  We did also discuss the addition of multiple medications and how doing so may be difficult to distinguish what is helpful/side effects/etc. We also discussed sleep hygiene.  Patient demonstrates excellent insight into his mental health and has been engaging in  "supportive cares that I believe are essential to management of anxiety.         Relevant Medications    escitalopram (LEXAPRO) 10 MG tablet    hydrOXYzine (VISTARIL) 25 MG capsule        Return in about 4 weeks (around 3/21/2023) for using a MyChart eVisit.    DAVIDA Hodge CNP  [facility]    Elle Ocasio is a 28 year old presenting for the following health issues:    Sleep Problem (Pt reports he is able to stay asleep but has trouble falling asleep)      Patient reports that he had ongoing issues with a physical manifestations of anxiety for approximately the last 3 to 4 weeks.  He has been seen twice in the last 2 weeks with concerns of palpitations and shortness of breath, both of which were comprehensively worked up and ultimately determined to be a result of uncontrolled anxiety.  He states that his greatest concern currently is sleep.  He reports a \"spiral\" with difficulty sleeping and then subsequent daytime feelings of anxiety, leading to increased difficulty sleeping.  He states that regardless of what he does prior to sleep, the moment he lays down he begins experiencing a sensation of his heart racing and then is unable to fall asleep for anywhere from 3 to 4 hours.  Preceding events include a recent job transition, which he does state he believes is contributory.  So far, he was prescribed trazodone for sleep.  He reports its \"better than nothing, but still not enough.\"  He was also prescribed propranolol for acute symptoms, which he states that does help with the palpitations, but he feels as if his overall control is poor which influences the effectiveness of the propranolol at the time.  He reports that he engages in mindfulness, breathing exercises the.  He was referred to counselor and has an upcoming appointment on Monday.  He denies a history of treated anxiety, but endorses fleeting concerns that have all been manageable in his past.      History of Present " Illness       He eats 4 or more servings of fruits and vegetables daily.He consumes 0 sweetened beverage(s) daily.He exercises with enough effort to increase his heart rate 20 to 29 minutes per day.  He exercises with enough effort to increase his heart rate 5 days per week.   He is taking medications regularly.       Review of Systems         Objective         Physical Exam   GENERAL: Healthy, alert and no distress  EYES: Eyes grossly normal to inspection.  No discharge or erythema, or obvious scleral/conjunctival abnormalities.  RESP: No audible wheeze, cough, or visible cyanosis.  No visible retractions or increased work of breathing.    SKIN: Visible skin clear. No significant rash, abnormal pigmentation or lesions.  NEURO: Cranial nerves grossly intact.  Mentation and speech appropriate for age.  PSYCH: Mentation appears normal, affect normal/bright, judgement and insight intact, normal speech and appearance well-groomed.            Video-Visit Details    Type of service:  Video Visit     Originating Location (pt. Location): Home  Distant Location (provider location):  On-site  Platform used for Video Visit: Vijaya

## 2023-02-21 NOTE — ASSESSMENT & PLAN NOTE
Patient sought care today for ongoing concerns of anxiety.  He has been seen twice for physical manifestations of significant anxiety and had thorough work-ups completed.  He does report that some of the interventions prescribed have been helpful, including the trazodone and propranolol, but he is still struggling.  Today, we discussed next steps.  He is amenable to initiating SSRI therapy.  Will start with escitalopram 10 mg daily and titrate upwards as needed.  Expected therapeutic effects and potential side effects discussed with patient.  He will plan on following up with me via an E-visit in 4 weeks to discuss changes to his medications at that time.  Of note, he is on low-dose trazodone, which carries the potential for interaction (serotonin syndrome) with his escitalopram, but we will plan to monitor closely and both are low-dose. We did discuss other as needed anxiety medications today, including benzodiazepines versus hydroxyzine.  Patient would like to avoid benzodiazepines which I am in agreement with.  We will trial 25 to 50 mg of hydroxyzine as needed for anxiety symptoms before bed.  Discussed potential interaction with trazodone.  I encouraged him to keep his appointment on Monday with counseling, as I believe this will be very beneficial.  We did also discuss the addition of multiple medications and how doing so may be difficult to distinguish what is helpful/side effects/etc. We also discussed sleep hygiene.  Patient demonstrates excellent insight into his mental health and has been engaging in supportive cares that I believe are essential to management of anxiety.

## 2023-03-08 ENCOUNTER — MYC REFILL (OUTPATIENT)
Dept: FAMILY MEDICINE | Facility: CLINIC | Age: 28
End: 2023-03-08
Payer: COMMERCIAL

## 2023-03-08 DIAGNOSIS — F51.01 PRIMARY INSOMNIA: ICD-10-CM

## 2023-03-08 DIAGNOSIS — F41.0 ANXIETY ATTACK: ICD-10-CM

## 2023-03-08 DIAGNOSIS — R00.2 PALPITATIONS: ICD-10-CM

## 2023-03-10 RX ORDER — PROPRANOLOL HYDROCHLORIDE 20 MG/1
20 TABLET ORAL 3 TIMES DAILY PRN
Qty: 60 TABLET | Refills: 0 | Status: SHIPPED | OUTPATIENT
Start: 2023-03-10 | End: 2023-04-10

## 2023-03-10 RX ORDER — TRAZODONE HYDROCHLORIDE 50 MG/1
50-100 TABLET, FILM COATED ORAL AT BEDTIME
Qty: 30 TABLET | Refills: 0 | Status: SHIPPED | OUTPATIENT
Start: 2023-03-10 | End: 2023-04-10

## 2023-03-10 NOTE — TELEPHONE ENCOUNTER
Rosenda: pt has followed up twice but not with you    Last Written Prescription Date:  2/14/23  Last Fill Quantity: 30,  # refills: 0   Last office visit: 2/14/23 virtual with Acosta  Future Office Visit:  Counseling visit on 3/15/23    Jackie PELLETIER RN  [facility]

## 2023-03-15 ENCOUNTER — VIRTUAL VISIT (OUTPATIENT)
Dept: PSYCHOLOGY | Facility: CLINIC | Age: 28
End: 2023-03-15
Payer: COMMERCIAL

## 2023-03-15 DIAGNOSIS — F41.1 GENERALIZED ANXIETY DISORDER: Primary | ICD-10-CM

## 2023-03-15 PROCEDURE — 90834 PSYTX W PT 45 MINUTES: CPT | Mod: VID

## 2023-03-15 ASSESSMENT — ANXIETY QUESTIONNAIRES
7. FEELING AFRAID AS IF SOMETHING AWFUL MIGHT HAPPEN: NEARLY EVERY DAY
1. FEELING NERVOUS, ANXIOUS, OR ON EDGE: NEARLY EVERY DAY
GAD7 TOTAL SCORE: 18
3. WORRYING TOO MUCH ABOUT DIFFERENT THINGS: NEARLY EVERY DAY
GAD7 TOTAL SCORE: 18
4. TROUBLE RELAXING: NEARLY EVERY DAY
6. BECOMING EASILY ANNOYED OR IRRITABLE: NOT AT ALL
7. FEELING AFRAID AS IF SOMETHING AWFUL MIGHT HAPPEN: NEARLY EVERY DAY
5. BEING SO RESTLESS THAT IT IS HARD TO SIT STILL: NEARLY EVERY DAY
2. NOT BEING ABLE TO STOP OR CONTROL WORRYING: NEARLY EVERY DAY
IF YOU CHECKED OFF ANY PROBLEMS ON THIS QUESTIONNAIRE, HOW DIFFICULT HAVE THESE PROBLEMS MADE IT FOR YOU TO DO YOUR WORK, TAKE CARE OF THINGS AT HOME, OR GET ALONG WITH OTHER PEOPLE: VERY DIFFICULT
8. IF YOU CHECKED OFF ANY PROBLEMS, HOW DIFFICULT HAVE THESE MADE IT FOR YOU TO DO YOUR WORK, TAKE CARE OF THINGS AT HOME, OR GET ALONG WITH OTHER PEOPLE?: VERY DIFFICULT
GAD7 TOTAL SCORE: 18

## 2023-03-15 ASSESSMENT — COLUMBIA-SUICIDE SEVERITY RATING SCALE - C-SSRS
TOTAL  NUMBER OF INTERRUPTED ATTEMPTS LIFETIME: NO
2. HAVE YOU ACTUALLY HAD ANY THOUGHTS OF KILLING YOURSELF?: NO
1. HAVE YOU WISHED YOU WERE DEAD OR WISHED YOU COULD GO TO SLEEP AND NOT WAKE UP?: NO
TOTAL  NUMBER OF INTERRUPTED ATTEMPTS LIFETIME: NO
TOTAL  NUMBER OF ABORTED OR SELF INTERRUPTED ATTEMPTS LIFETIME: NO
6. HAVE YOU EVER DONE ANYTHING, STARTED TO DO ANYTHING, OR PREPARED TO DO ANYTHING TO END YOUR LIFE?: NO
6. HAVE YOU EVER DONE ANYTHING, STARTED TO DO ANYTHING, OR PREPARED TO DO ANYTHING TO END YOUR LIFE?: NO
2. HAVE YOU ACTUALLY HAD ANY THOUGHTS OF KILLING YOURSELF?: NO
1. HAVE YOU WISHED YOU WERE DEAD OR WISHED YOU COULD GO TO SLEEP AND NOT WAKE UP?: NO
ATTEMPT LIFETIME: NO
TOTAL  NUMBER OF ABORTED OR SELF INTERRUPTED ATTEMPTS LIFETIME: NO
ATTEMPT LIFETIME: NO

## 2023-03-15 NOTE — PROGRESS NOTES
[facility]   Mental Health & Addiction Services     Progress Note - Initial Visit    Patient  Name:  Amarjit Lin Date: 3/15/23           Service Type: Individual     Visit Start Time: 10:00 am  Visit End Time: 10:45 am    Visit #: 1    Attendees: Client attended alone    Service Modality:  Video Visit:      Provider verified identity through the following two step process.  Patient provided:  Patient     Telemedicine Visit: The patient's condition can be safely assessed and treated via synchronous audio and visual telemedicine encounter.      Reason for Telemedicine Visit: Services only offered telehealth    Originating Site (Patient Location): Patient's home    Distant Site (Provider Location): Provider Remote Setting- Home Office    Consent:  The patient/guardian has verbally consented to: the potential risks and benefits of telemedicine (video visit) versus in person care; bill my insurance or make self-payment for services provided; and responsibility for payment of non-covered services.     Patient would like the video invitation sent by:  [employer]    Mode of Communication:  Video Conference via [employer]    Distant Location (Provider):  Off-site    As the provider I attest to compliance with applicable laws and regulations related to telemedicine.       DATA:   Interactive Complexity: No   Crisis: No     Presenting Concerns/  Current Stressors:   Patient was referred by primary care for JOE. Patient reported a history of anxiety since his teen years. His anxiety symptoms increased recently with a new position at his company. And that the symptoms affect him in his social and work life. Patient is motivated to decrease his symptoms and to develop skills to help achieve this. Patient is taking medications as prescribed by his primary care doctor. Patient reports no SI/SIB, no HI, no delusions, no hallucinations, and the CSSRS is no risk at this time.      ASSESSMENT:  Mental Status  Assessment:  Appearance:   Appropriate   Eye Contact:   Good   Psychomotor Behavior: Normal   Attitude:   Cooperative   Orientation:   All  Speech   Rate / Production: Normal/ Responsive   Volume:  Normal   Mood:    Anxious   Affect:    Appropriate   Thought Content:  Clear   Thought Form:  Logical   Insight:    Good       Safety Issues and Plan for Safety and Risk Management:     [place] Suicide Severity Rating Scale (Lifetime/Recent)  [place] Suicide Severity Rating (Lifetime/Recent) 3/15/2023   1. Wish to be Dead (Lifetime) 0   2. Non-Specific Active Suicidal Thoughts (Lifetime) 0   Actual Attempt (Lifetime) 0   Has subject engaged in non-suicidal self-injurious behavior? (Lifetime) 0   Interrupted Attempts (Lifetime) 0   Aborted or Self-Interrupted Attempt (Lifetime) 0   Preparatory Acts or Behavior (Lifetime) 0   Calculated C-SSRS Risk Score (Lifetime/Recent) No Risk Indicated     Patient denies current fears or concerns for personal safety.  Patient denies current or recent suicidal ideation or behaviors.  Patient denies current or recent homicidal ideation or behaviors.  Patient denies current or recent self injurious behavior or ideation.  Patient denies other safety concerns.  Recommended that patient call 911 or go to the local ED should there be a change in any of these risk factors.  Patient reports there are no firearms in the house.     Diagnostic Criteria:  Generalized Anxiety Disorder  A. Excessive anxiety and worry about a number of events or activities (such as work or school performance).   B. The person finds it difficult to control the worry.   - Restlessness or feeling keyed up or on edge.    - Being easily fatigued.    - Difficulty concentrating or mind going blank.    - Muscle tension.    - Sleep disturbance (difficulty falling or staying asleep, or restless unsatisfying sleep).   D. The focus of the anxiety and worry is not confined to features of an Axis I disorder.  E. The anxiety,  worry, or physical symptoms cause clinically significant distress or impairment in social, occupational, or other important areas of functioning.   F. The disturbance is not due to the direct physiological effects of a substance (e.g., a drug of abuse, a medication) or a general medical condition (e.g., hyperthyroidism) and does not occur exclusively during a Mood Disorder, a Psychotic Disorder, or a Pervasive Developmental Disorder.      DSM5 Diagnoses: (Sustained by DSM5 Criteria Listed Above)  Diagnoses: 300.02 (F41.1) Generalized Anxiety Disorder  Psychosocial & Contextual Factors: Patient grew up in a high expectation household. Father was a  in the community.  Intervention:   Patient was given brief overview of CBT and how it can help anxiety. Patient was able to identify triggers and thoughts related to increased anxiety. Psychoeducation was provided - mindfulness skill.  Collateral Reports Completed:  Not Applicable      PLAN: (Homework, other):  1. Provider will continue Diagnostic Assessment.  Patient was given the following to do until next session:  Practice mindfulness and to begin a journal (write down different observations, triggers, events, thoughts, feelings, related to anxiety).    2. Provider recommended the following referrals: n/a.      3.  Suicide Risk and Safety Concerns were assessed for Amarjit Lin.    Patient meets the following risk assessment and triage: Patient denied any current/recent/lifetime history of suicidal ideation and/or behaviors.  No safety plan indicated at this time.       Sonal Grey LPC  March 15, 2023  Note reviewed and clinical supervision by FLORA Sosa, LICSW 3/20/2023

## 2023-03-22 ENCOUNTER — FCC EXTENDED DOCUMENTATION (OUTPATIENT)
Dept: PSYCHOLOGY | Facility: CLINIC | Age: 28
End: 2023-03-22

## 2023-03-22 ENCOUNTER — VIRTUAL VISIT (OUTPATIENT)
Dept: PSYCHOLOGY | Facility: CLINIC | Age: 28
End: 2023-03-22
Payer: COMMERCIAL

## 2023-03-22 DIAGNOSIS — F41.1 GENERALIZED ANXIETY DISORDER: Primary | ICD-10-CM

## 2023-03-22 PROCEDURE — 90834 PSYTX W PT 45 MINUTES: CPT | Mod: VID

## 2023-03-22 NOTE — PROGRESS NOTES
[facility]                                     Progress Note    Patient Name: Amarjit Lin  Date: 3/22/23           Service Type: Individual      Session Start Time: 10:00 am Session End Time: 10:40 am     Session Length: 40 minutes    Session #: 2    Attendees: Client attended alone    Service Modality:  Video Visit:      Provider verified identity through the following two step process.  Patient provided:  Patient is known previously to provider    Telemedicine Visit: The patient's condition can be safely assessed and treated via synchronous audio and visual telemedicine encounter.      Reason for Telemedicine Visit: Patient has requested telehealth visit    Originating Site (Patient Location): Patient's home    Distant Site (Provider Location): Provider Remote Setting- Home Office    Consent:  The patient/guardian has verbally consented to: the potential risks and benefits of telemedicine (video visit) versus in person care; bill my insurance or make self-payment for services provided; and responsibility for payment of non-covered services.     Patient would like the video invitation sent by:  My Chart    Mode of Communication:  Video Conference via [facility]    Distant Location (Provider):  Off-site    As the provider I attest to compliance with applicable laws and regulations related to telemedicine.    DATA  Interactive Complexity: No  Crisis: No        Progress Since Last Session (Related to Symptoms / Goals / Homework):   Symptoms: Improving - patient reports decreased anxiety symptoms.    Homework: Achieved (Homework of writing down thoughts/feelings related to anxiety symptoms, events, and circumstances.      Episode of Care Goals: Achieved / completed to satisfaction - ACTION (Actively working towards change); Intervened by reinforcing change plan / affirming steps taken.     Current / Ongoing Stressors and Concerns:   New work position creates situations where patient experiences  increased anxiety.     Treatment Objective(s) Addressed in This Session:   Using counteracting statements and beliefs to replace negative thoughts/beliefs.     Intervention:   CBT: patient was able to identify multiple negative beliefs that contribute to anxiety symptoms. Patient has been practicing using these new beliefs and has found it helpful. Patient is highly motivated for change and is in the action stage of change. Time in session was spent on how he learned these beliefs as a child and on loving/supporting one self as an adult.    Assessments completed prior to visit:  Patient did not complete new assessments today.      ASSESSMENT: Current Emotional / Mental Status (status of significant symptoms):   Risk status (Self / Other harm or suicidal ideation)   Patient denies current fears or concerns for personal safety.   Patient denies current or recent suicidal ideation or behaviors.   Patient denies current or recent homicidal ideation or behaviors.   Patient denies current or recent self injurious behavior or ideation.   Patient denies other safety concerns.   Patient reports there has been no change in risk factors since their last session.     Patient reports there has been no change in protective factors since their last session.     Recommended that patient call 911 or go to the local ED should there be a change in any of these risk factors.     Appearance:   Appropriate    Eye Contact:   Good    Psychomotor Behavior: Normal    Attitude:   Cooperative    Orientation:   All   Speech    Rate / Production: Normal     Volume:  Normal    Mood:    Normal   Affect:    Appropriate    Thought Content:  Clear    Thought Form:  Coherent  Logical    Insight:    Good      Medication Review:   No changes to current psychiatric medication(s)     Medication Compliance:   Yes     Changes in Health Issues:   None reported     Chemical Use Review:   Substance Use: Chemical use reviewed, no active concerns identified       Tobacco Use: No current tobacco use.      Diagnosis:  1. Generalized anxiety disorder        Collateral Reports Completed:   Not Applicable    PLAN: (Patient Tasks / Therapist Tasks / Other)  Patient will continue to practice using counteracting new beliefs, continue to focus on self-care (exercise, sleep, eating well), and continue to practice mindfulness skills. Next session is scheduled in two weeks, April 5, at 10 am.        Sonal Grey LPC   Note reviewed and clinical supervision by FLORA Sosa, [facility] 3/22/2023

## 2023-03-29 ENCOUNTER — APPOINTMENT (OUTPATIENT)
Dept: URGENT CARE | Facility: CLINIC | Age: 28
End: 2023-03-29
Payer: COMMERCIAL

## 2023-03-29 ASSESSMENT — ANXIETY QUESTIONNAIRES
7. FEELING AFRAID AS IF SOMETHING AWFUL MIGHT HAPPEN: SEVERAL DAYS
GAD7 TOTAL SCORE: 13
8. IF YOU CHECKED OFF ANY PROBLEMS, HOW DIFFICULT HAVE THESE MADE IT FOR YOU TO DO YOUR WORK, TAKE CARE OF THINGS AT HOME, OR GET ALONG WITH OTHER PEOPLE?: VERY DIFFICULT

## 2023-03-31 ENCOUNTER — MYC MEDICAL ADVICE (OUTPATIENT)
Dept: FAMILY MEDICINE | Facility: CLINIC | Age: 28
End: 2023-03-31
Payer: COMMERCIAL

## 2023-03-31 DIAGNOSIS — F41.1 GENERALIZED ANXIETY DISORDER: ICD-10-CM

## 2023-03-31 RX ORDER — ESCITALOPRAM OXALATE 20 MG/1
20 TABLET ORAL DAILY
Qty: 30 TABLET | Refills: 1 | Status: SHIPPED | OUTPATIENT
Start: 2023-03-31 | End: 2023-05-22

## 2023-03-31 RX ORDER — HYDROXYZINE PAMOATE 25 MG/1
50 CAPSULE ORAL 3 TIMES DAILY PRN
Qty: 60 CAPSULE | Refills: 1 | Status: SHIPPED | OUTPATIENT
Start: 2023-03-31 | End: 2023-05-22

## 2023-03-31 NOTE — TELEPHONE ENCOUNTER
Medication Request  Medication name:   Pending Prescriptions:                       Disp   Refills    hydrOXYzine (VISTARIL) 25 MG capsule      60 cap*1            Sig: Take 2 capsules (50 mg) by mouth 3 times daily as           needed for itching or anxiety      Requested Pharmacy: [facility]  When was patient last seen for this?:  2/21/23  Patient offered appointment:  No  Okay to leave a detailed message: yes

## 2023-04-05 ENCOUNTER — VIRTUAL VISIT (OUTPATIENT)
Dept: PSYCHOLOGY | Facility: CLINIC | Age: 28
End: 2023-04-05
Payer: COMMERCIAL

## 2023-04-05 DIAGNOSIS — F41.1 GENERALIZED ANXIETY DISORDER: Primary | ICD-10-CM

## 2023-04-05 PROCEDURE — 90791 PSYCH DIAGNOSTIC EVALUATION: CPT | Mod: VID

## 2023-04-05 ASSESSMENT — PATIENT HEALTH QUESTIONNAIRE - PHQ9: SUM OF ALL RESPONSES TO PHQ QUESTIONS 1-9: 13

## 2023-04-05 NOTE — PROGRESS NOTES
"[facility]      PATIENT'S NAME: Amarjit Lin  PREFERRED NAME: Amarjit  PRONOUNS: he/him/his    MRN: [id number]  : 1995  ADDRESS: [address] [place] [id number]  [facility] NUM[facility]:  [id number]  DATE OF SERVICE: 23  START TIME: 10:00 am  END TIME: 10:45 am  PREFERRED PHONE: [phone]  May we leave a program related message: Yes  SERVICE MODALITY:  Video Visit:      Provider verified identity through the following two step process.  Patient provided:  Patient is known previously to provider    Telemedicine Visit: The patient's condition can be safely assessed and treated via synchronous audio and visual telemedicine encounter.      Reason for Telemedicine Visit: Patient has requested telehealth visit    Originating Site (Patient Location): Patient's home    Distant Site (Provider Location): Provider Remote Setting- Home Office    Consent:  The patient/guardian has verbally consented to: the potential risks and benefits of telemedicine (video visit) versus in person care; bill my insurance or make self-payment for services provided; and responsibility for payment of non-covered services.     Patient would like the video invitation sent by:  My Chart    Mode of Communication:  Video Conference via Pixelapse    Distant Location (Provider):  Off-site    As the provider I attest to compliance with applicable laws and regulations related to telemedicine.    UNIVERSAL ADULT Mental Health DIAGNOSTIC ASSESSMENT    Identifying Information:  Patient is a 28 year old,   individual.  Patient was referred for an assessment by primary care clinic.  Patient attended the session alone.    Chief Complaint:   The reason for seeking services at this time is: \"General and Social Anxiety\".  The problem(s) began 10/01/10.    Patient has not attempted to resolve these concerns in the past.    Social/Family History:  Patient reported they grew up in other [place], [place].  They were raised by " biological parents  .  Parents were always together.  Patient reported his childhood experience included high expectations from his parents. His father is a  in the community and the children experienced significant pressure to be perfect children. Patient described their current relationships with family of origin as not close, but still maintains a relationship.     The patient describes their cultural background as . Cultural influences and impact on patient's life structure, values, norms, and healthcare: [place]  Contextual influences on patient's health include: Contextual Factors: Family Factors - patient's father is a  and he grew up in a high expectations family, the family culture included mistakes were not acceptable. These factors will be addressed in the Preliminary Treatment plan. Patient identified their preferred language to be English. Patient reported they does not need the assistance of an  or other support involved in therapy.     Patient reported had no significant delays in developmental tasks.   Patient's highest education level was associate degree / vocational certificate  .  Patient identified the following learning problems: none reported.  Modifications will not be used to assist communication in therapy. Patient reports they are not able to understand written materials.    Patient reported the following relationship history, never .  Patient's current relationship status is single.   Patient identified their sexual orientation as heterosexual.  Patient reported having no child(andreas). Patient identified friends as part of their support system.  Patient identified the quality of these relationships as fair.     Patient's current living/housing situation involves staying in own home/apartment, and has roommates. His members of his immediate family include his mother, father, two brothers, and he reported that housing is  stable.    Patient is currently employed fulltime.  Patient reports their finances are obtained through employment. Patient does identify finances as a current stressor.      Patient reported that they have not been involved with the legal system. Patient does not report being under probation/ parole/ jurisdiction. They are not under any current court jurisdiction. .    Patient's Strengths and Limitations:  Patient identified the following strengths or resources that will help them succeed in treatment: insight, intelligence, motivation and work ethic. Things that may interfere with the patient's success in treatment include: none identified.     Assessments:  The following assessments were completed by patient for this visit:  PHQ9:       4/5/2023     9:59 AM   PHQ-9 SCORE   PHQ-9 Total Score 13     GAD7:       2/14/2023    10:42 AM 3/15/2023     8:19 AM 3/29/2023     4:31 PM   JOE-7 SCORE   Total Score 11 (moderate anxiety) 18 (severe anxiety) 13 (moderate anxiety)   Total Score 11 18      CAGE-AID:       3/15/2023     8:24 AM   CAGE-AID Total Score   Total Score 0   Total Score MyChart 0 (A total score of 2 or greater is considered clinically significant)     PROMIS 10-Global Health (all questions and answers displayed):       3/15/2023     8:23 AM   PROMIS 10   In general, would you say your health is: Fair   In general, would you say your quality of life is: Good   In general, how would you rate your physical health? Fair   In general, how would you rate your mental health, including your mood and your ability to think? Fair   In general, how would you rate your satisfaction with your social activities and relationships? Poor   In general, please rate how well you carry out your usual social activities and roles Fair   To what extent are you able to carry out your everyday physical activities such as walking, climbing stairs, carrying groceries, or moving a chair? Completely   In the past 7 days, how often have  you been bothered by emotional problems such as feeling anxious, depressed, or irritable? Always   In the past 7 days, how would you rate your fatigue on average? Severe   In the past 7 days, how would you rate your pain on average, where 0 means no pain, and 10 means worst imaginable pain? 3   In general, would you say your health is: 2   In general, would you say your quality of life is: 3   In general, how would you rate your physical health? 2   In general, how would you rate your mental health, including your mood and your ability to think? 2   In general, how would you rate your satisfaction with your social activities and relationships? 1   In general, please rate how well you carry out your usual social activities and roles. (This includes activities at home, at work and in your community, and responsibilities as a parent, child, spouse, employee, friend, etc.) 2   To what extent are you able to carry out your everyday physical activities such as walking, climbing stairs, carrying groceries, or moving a chair? 5   In the past 7 days, how often have you been bothered by emotional problems such as feeling anxious, depressed, or irritable? 5   In the past 7 days, how would you rate your fatigue on average? 4   In the past 7 days, how would you rate your pain on average, where 0 means no pain, and 10 means worst imaginable pain? 3   Global Mental Health Score 7   Global Physical Health Score 13   PROMIS TOTAL - SUBSCORES 20     [place] Suicide Severity Rating Scale (Lifetime/Recent)      3/15/2023    11:56 AM 3/15/2023     3:08 PM   [place] Suicide Severity Rating (Lifetime/Recent)   1. Wish to be Dead (Lifetime) N N   2. Non-Specific Active Suicidal Thoughts (Lifetime) N N   Actual Attempt (Lifetime) N N   Has subject engaged in non-suicidal self-injurious behavior? (Lifetime) N N   Interrupted Attempts (Lifetime) N N   Aborted or Self-Interrupted Attempt (Lifetime) N N   Preparatory Acts or Behavior  (Lifetime) N N   Calculated C-SSRS Risk Score (Lifetime/Recent) No Risk Indicated No Risk Indicated        Personal and Family Medical History:  Patient does not report a family history of mental health concerns.  Patient reports family history is not on file.    Patient was diagnosed with JOE by primary care provider: 2/21/23 Anjali Olivas APRN CNP  No other history of diagnosed mental health challenges.    Patient had a physical exam to rule out medical causes for current symptoms.  Date of last physical exam was within the past year. Client was encouraged to follow up with PCP if symptoms were to develop. The patient has a [facility] Primary Care Provider, who is named No Ref-Primary, Physician..  Patient reports no current medical concerns.  Patient denies any issues with pain..   There are not significant appetite / nutritional concerns / weight changes.   Patient does not report a history of head injury / trauma / cognitive impairment.      Patient reports current meds as:     escitalopram (LEXAPRO) 10 MG tablet  hydrOXYzine (VISTARIL) 25 MG capsule    propranolol (INDERAL) 20 MG tablet    traZODone (DESYREL) 50 MG tablet    Medication Adherence:  Patient reports taking.  taking prescribed medications as prescribed.    Patient Allergies:    Allergies   Allergen Reactions     Seasonal Allergies        Medical History:  No past medical history on file. Chart review shows back pain in 2018, shortness of breath/palpitation -February 2023, and generalized anxiety disorder - February 2023.     Current Mental Status Exam:   Appearance:  Appropriate    Eye Contact:  Good   Psychomotor:  Normal       Gait / station:  Not able to assess, virtual appointments  Attitude / Demeanor: Cooperative   Speech      Rate / Production: Normal/ Responsive      Volume:  Normal  volume      Language:  no problems  Mood:   Anxious   Affect:   Appropriate    Thought Content: Clear   Thought Process: Logical       Associations: No  loosening of associations  Insight:   Good   Judgment:  Intact   Orientation:  All  Attention/concentration: Good      Substance Use:  Patient did not report a family history of substance use concerns; see medical history section for details.  Patient has not received chemical dependency treatment in the past.  Patient has not ever been to detox.      Patient is not currently receiving any chemical dependency treatment.           Substance History of use Age of first use Date of last use     Pattern and duration of use (include amounts and frequency)   Alcohol used in the past   14 02/01/23 REPORTS SUBSTANCE USE: N/A   Cannabis   currently use 14 03/14/23 REPORTS SUBSTANCE USE: reports using substance 1 times per day and has small amount in order to help with sleep before bed - at a time.   Patient reports heaviest use was (writer did not assess during session).     Amphetamines   used in the past   03/01/15 REPORTS SUBSTANCE USE: N/A   Cocaine/crack    never used       REPORTS SUBSTANCE USE: N/A   Hallucinogens used in the past   15 03/01/11  REPORTS SUBSTANCE USE: N/A   Inhalants never used         REPORTS SUBSTANCE USE: N/A   Heroin never used         REPORTS SUBSTANCE USE: N/A   Other Opiates never used     REPORTS SUBSTANCE USE: N/A   Benzodiazepine   never used     REPORTS SUBSTANCE USE: N/A   Barbiturates never used     REPORTS SUBSTANCE USE: N/A   Over the counter meds never used     REPORTS SUBSTANCE USE: N/A   Caffeine currently use 14   REPORTS SUBSTANCE USE: reports using substance 5 times per day and has 1 cup at a time.   Patient reports heaviest use was (writer did not assess during session).   Nicotine  used in the past 14 02/01/17 REPORTS SUBSTANCE USE: N/A   Other substances not listed above:  Identify:  never used     REPORTS SUBSTANCE USE: N/A     Patient reported the following problems as a result of their substance use: no problems, not applicable.    Substance Use: No symptoms    Based on the  negative CAGE score and clinical interview there  are not indications of drug or alcohol abuse.      Significant Losses / Trauma / Abuse / Neglect Issues:   Patient did not serve in the .  There are indications or report of significant loss, trauma, abuse or neglect issues related to: are no indications and client denies any losses, trauma, abuse, or neglect concerns.  Concerns for possible neglect are not present.     Safety Assessment:   Patient denies current homicidal ideation and behaviors.  Patient denies current self-injurious ideation and behaviors.    Patient denied risk behaviors associated with substance use.  Patient denies any high risk behaviors associated with mental health symptoms.  Patient reports the following current concerns for their personal safety: None.  Patient reports there are not firearms in the house.       There are no firearms in the home..    History of Safety Concerns:  Patient denied a history of homicidal ideation.     Patient denied a history of personal safety concerns.    Patient denied a history of assaultive behaviors.    Patient denied a history of sexual assault behaviors.     Patient denied a history of risk behaviors associated with substance use.  Patient denies any history of high risk behaviors associated with mental health symptoms.  Patient reports the following protective factors: forward or future oriented thinking; safe and stable environment; effectively controls impulses; daily obligations; structured day; effective problem solving skills; commitment to well being    Risk Plan:  See Recommendations for Safety and Risk Management Plan    Review of Symptoms per patient report:   Depression: Change in sleep, Change in energy level and Difficulties concentrating  Cassie:  No Symptoms  Psychosis: No Symptoms  Anxiety: Excessive worry, Nervousness, Physical complaints, such as headaches, stomachaches, muscle tension, Social anxiety, Sleep disturbance,  Ruminations and Poor concentration  Panic:  Palpitations, Shortness of breath and Sense of impending doom  Post Traumatic Stress Disorder:  No Symptoms   Eating Disorder: No Symptoms  ADD / ADHD:  No symptoms  Conduct Disorder: No symptoms  Autism Spectrum Disorder: No symptoms  Obsessive Compulsive Disorder: No Symptoms    Patient reports the following compulsive behaviors and treatment history: none.      Diagnostic Criteria:   Generalized Anxiety Disorder  A. Excessive anxiety and worry about a number of events or activities (such as work or school performance).   B. The person finds it difficult to control the worry.   - Restlessness or feeling keyed up or on edge.    - Being easily fatigued.    - Difficulty concentrating or mind going blank.    - Muscle tension.    - Sleep disturbance (difficulty falling or staying asleep, or restless unsatisfying sleep).   D. The focus of the anxiety and worry is not confined to features of an Axis I disorder.  E. The anxiety, worry, or physical symptoms cause clinically significant distress or impairment in social, occupational, or other important areas of functioning.   F. The disturbance is not due to the direct physiological effects of a substance (e.g., a drug of abuse, a medication) or a general medical condition (e.g., hyperthyroidism) and does not occur exclusively during a Mood Disorder, a Psychotic Disorder, or a Pervasive Developmental Disorder.    Functional Status:  Patient reports the following functional impairments:  relationship(s), social interactions and work / vocational responsibilities.     Nonprogrammatic care:  Patient is requesting basic services to address current mental health concerns.    Clinical Summary:  1. Reason for assessment: patient was diagnosed and treated for generalized anxiety disorder by primary care physician.  .  2. Psychosocial, Cultural and Contextual Factors: Individual factors include father is a  and the family culture  included mistakes were not acceptable.  3. Principal DSM5 Diagnoses  (Sustained by DSM5 Criteria Listed Above):   300.02 (F41.1) Generalized Anxiety Disorder.  4. Other Diagnoses that is relevant to services:  none  5. Provisional Diagnosis: none  6. Prognosis: Expect Improvement.  7. Likely consequences of symptoms if not treated: symptoms will likely worsen.  8. Client strengths include:  caring, creative, educated, employed, good listener, insightful, intelligent, motivated and open to learning .     Recommendations:     1. Plan for Safety and Risk Management:   Safety and Risk: Recommended that patient call 911 or go to the local ED should there be a change in any of these risk factors..          Report to child / adult protection services was NA.     2. Patient's identified family expectations will be addressed as part of the treatment plan.     3. Initial Treatment will focus on:    Anxiety - helping patient to identify thoughts/beliefs that increase anxiety symptoms..     4. Resources/Service Plan:    services are not indicated.   Modifications to assist communication are not indicated.   Additional disability accommodations are not indicated.      5. Collaboration:   Collaboration / coordination of treatment will be initiated with the following  support professionals: none at this time.      6.  Referrals:   The following referral(s) will be initiated: no referrals are indicated at this time. Next Scheduled Appointment: 4/19/23 at 10 am.     A Release of Information has been obtained for the following: NA.     Emergency Contact: David España (friend) [phone]     Clinical Substantiation/medical necessity for the above recommendations: Patient's symptoms meet clinical necessity  - without treatment patient's symptoms may become severe and further affect functionality. Current functional  impairments include occupational, social, and relationships.    7. SAMSON:    SAMSON: No SAMSON problems or  concerns reported or present. Recommendations: N/A     8. Records:   These were reviewed at time of assessment.   Information in this assessment was obtained from the medical record and provided by patient who is a good historian. Patient will have open access to their mental health medical record.    9.   Interactive Complexity: No      Provider Name/ Credentials:  Sonal Grey MA, LPC 4/5/23    Assessment reviewed and clinical supervision by FLORA Sosa, LICSW 4/6/2023

## 2023-04-07 DIAGNOSIS — F51.01 PRIMARY INSOMNIA: ICD-10-CM

## 2023-04-07 DIAGNOSIS — F41.0 ANXIETY ATTACK: ICD-10-CM

## 2023-04-07 DIAGNOSIS — R00.2 PALPITATIONS: ICD-10-CM

## 2023-04-10 RX ORDER — TRAZODONE HYDROCHLORIDE 50 MG/1
TABLET, FILM COATED ORAL
Qty: 30 TABLET | Refills: 0 | Status: SHIPPED | OUTPATIENT
Start: 2023-04-10

## 2023-04-10 RX ORDER — PROPRANOLOL HYDROCHLORIDE 20 MG/1
20 TABLET ORAL 3 TIMES DAILY PRN
Qty: 60 TABLET | Refills: 0 | Status: SHIPPED | OUTPATIENT
Start: 2023-04-10

## 2023-04-10 NOTE — TELEPHONE ENCOUNTER
,  --Please contact patient and ask him to schedule an appointment to establish care with a provider in the [facility] system that is most convenient for him.   --I cannot tell who is his primary provider or clinic.     --Please tell Amarjit he will get the best care if he picks one provider for now and follows with that provider.  --It is important to have one provider keeping track of his whole picture.   --If he sees multiple providers things could get missed.  --Also this will make getting refills on his medications much easier and will decrease the chance of them getting held up.      --Last visit:  2/14/2023     --Future Visit: none with family practice.            Warnings Override History for traZODone (DESYREL) 50 MG tablet [[id number]]    Overridden by Anjali Olivas APRN CNP on Mar 31, 2023 11:37 AM   Drug-Drug   1. SELECTIVE SEROTONIN REUPTAKE INHIBITORS / SEROTONERGIC NON-OPIOID CNS DEPRESSANTS [Level: Major] [Reason: Tolerated medication/side effects in past]   Other Orders: escitalopram (LEXAPRO) 20 MG tablet         Overridden by Rosenda Shane APRN CNP on Mar 10, 2023 4:00 PM   Drug-Drug   1. SELECTIVE SEROTONIN REUPTAKE INHIBITORS / SEROTONERGIC NON-OPIOID CNS DEPRESSANTS [Level: Major]   Other Orders: escitalopram (LEXAPRO) 10 MG tablet

## 2023-04-10 NOTE — TELEPHONE ENCOUNTER
LVM, regarding med refill. Advice pt to call and schedule an appt to establish care with a [facility] provider or choose one provider that he see to be his primary PCP for best care and further refill if need. Let pt know if he has more questions, he can call back to understand better why it important to establish care with only one provider.

## 2023-04-19 ENCOUNTER — VIRTUAL VISIT (OUTPATIENT)
Dept: PSYCHOLOGY | Facility: CLINIC | Age: 28
End: 2023-04-19
Payer: COMMERCIAL

## 2023-04-19 DIAGNOSIS — F41.1 GENERALIZED ANXIETY DISORDER: Primary | ICD-10-CM

## 2023-04-19 PROCEDURE — 90834 PSYTX W PT 45 MINUTES: CPT | Mod: VID

## 2023-04-19 NOTE — PROGRESS NOTES
[facility]                                     Progress Note    Patient Name: Amarjit Lin  Date: 4/19/23           Service Type: Individual      Session Start Time: 10:00 am  Session End Time: 10:45 am     Session Length: 45 minutes    Session #: 4    Attendees: Client attended alone    Service Modality:  Video Visit:      Provider verified identity through the following two step process.  Patient provided:  Patient is known previously to provider    Telemedicine Visit: The patient's condition can be safely assessed and treated via synchronous audio and visual telemedicine encounter.      Reason for Telemedicine Visit: Services only offered telehealth    Originating Site (Patient Location): Patient's home    Distant Site (Provider Location): Provider Remote Setting- Home Office    Consent:  The patient/guardian has verbally consented to: the potential risks and benefits of telemedicine (video visit) versus in person care; bill my insurance or make self-payment for services provided; and responsibility for payment of non-covered services.     Patient would like the video invitation sent by:  My Chart    Mode of Communication:  Video Conference via [facility]    Distant Location (Provider):  Off-site    As the provider I attest to compliance with applicable laws and regulations related to telemedicine.    DATA  Interactive Complexity: No  Crisis: No        Progress Since Last Session (Related to Symptoms / Goals / Homework):   Symptoms: Improving patient reports improved symptoms    Homework: Achieved / completed to satisfaction      Episode of Care Goals: Satisfactory progress - ACTION (Actively working towards change); Intervened by reinforcing change plan / affirming steps taken     Current / Ongoing Stressors and Concerns:   Continued work stressors, difficulty with sleep, anxiety symptoms.     Treatment Objective(s) Addressed in This Session:   identify his fears / thoughts that contribute to  feeling anxious  Goal: decrease anxiety symptoms     Intervention:   CBT: therapist used encouragement and validation to help patient recognize the changes he has achieved, reinforce those changes - patient continues to use mediation, mindfulness, and countering negative thoughts, to decrease anxiety symptoms    Assessments completed prior to visit:  The following assessments were completed by patient for this visit:  none      ASSESSMENT: Current Emotional / Mental Status (status of significant symptoms):   Risk status (Self / Other harm or suicidal ideation)   Patient denies current fears or concerns for personal safety.   Patient denies current or recent suicidal ideation or behaviors.   Patient denies current or recent homicidal ideation or behaviors.   Patient denies current or recent self injurious behavior or ideation.   Patient denies other safety concerns.   Patient reports there has been no change in risk factors since their last session.     Patient reports there has been no change in protective factors since their last session.     Recommended that patient call 911 or go to the local ED should there be a change in any of these risk factors.     Appearance:   Appropriate    Eye Contact:   Good    Psychomotor Behavior: Normal    Attitude:   Cooperative    Orientation:   All   Speech    Rate / Production: Normal     Volume:  Normal    Mood:    Anxious    Affect:    Appropriate    Thought Content:  Clear    Thought Form:  Coherent  Logical    Insight:    Good      Medication Review:   No changes to current psychiatric medication(s)     Medication Compliance:   Yes     Changes in Health Issues:   None reported     Chemical Use Review:   Substance Use: Chemical use reviewed, no active concerns identified      Tobacco Use: No current tobacco use.      Diagnosis:  1. Generalized anxiety disorder        Collateral Reports Completed:   Not Applicable    PLAN: (Patient Tasks / Therapist Tasks / Other)  Patient will  "continue using skills he developed and find helpful. Patient will continue using mindfulness, meditation, and countering negative thoughts with more helpful positive thoughts.        Sonal Grey, LPC  4/19/23  Note reviewed and clinical supervision by FLORA Sosa, [facility] 5/5/2023                                                            ______________________________________________________________________    Individual Treatment Plan    Patient's Name: Amarjit Lin  YOB: 1995    Date of Creation: 4/19/23  Date Treatment Plan Last Reviewed/Revised: 4/19/23    DSM5 Diagnoses: 300.02 (F41.1) Generalized Anxiety Disorder  Psychosocial / Contextual Factors: patient grew up in a high performance/expectation family.   PROMIS (reviewed every 90 days):   The following assessments were completed by patient for this visit:  PROMIS 10-Global Health (only subscores and total score):       3/15/2023     8:23 AM   PROMIS-10 Scores Only   Global Mental Health Score 7   Global Physical Health Score 13   PROMIS TOTAL - SUBSCORES 20       Referral / Collaboration:  Not indicated at this time. PCP as needed..    Anticipated number of session for this episode of care: 9-12 sessions  Anticipation frequency of session: Every other week  Anticipated Duration of each session: 38-52 minutes  Treatment plan will be reviewed in 90 days or when goals have been changed.       MeasurableTreatment Goal(s) related to diagnosis / functional impairment(s)  Goal 1: Patient will decrease anxiety symptoms.    I will know I've met my goal when my joe score is lower: on 3/29/23 it was a 13, goal is reached when it is a 6.\"      2/14/2023    10:42 AM 3/15/2023     8:19 AM 3/29/2023     4:31 PM   JOE-7 SCORE   Total Score 11 (moderate anxiety) 18 (severe anxiety) 13 (moderate anxiety)   Total Score 11 18        Objective #A (Patient Action)    Patient will use thought-stopping strategy daily to reduce intrusive " thoughts.  Status: Continued - Date(s):4/19/23     Intervention(s)  Therapist will teach CBT and thought stopping strategies, and assign homework.    Objective #B  Patient will identify his initial signs or symptoms of anxiety, fears/thoughts that contribute to feeling anxious.   Status: Continued - Date(s):4/19/23     Intervention(s)  Therapist will use encouragement and validation to help patient identify the signs of anxiety he experiences, and to help patient express his fears/thoughts related to anxiety.    Objective #C  Patient will use meditation to help decrease anxiety symptoms.  Status: Status: Continued - Date(s):4/19/23     Intervention(s)  Therapist will teach meditation skills and assign homework.        Patient has not reviewed nor agreed to the above plan.      Sonal Grey LPC  April 19, 2023  Treatment plan reviewed and clinical supervision by FLORA Sosa, [facility] 5/5/2023

## 2023-05-10 ENCOUNTER — VIRTUAL VISIT (OUTPATIENT)
Dept: PSYCHOLOGY | Facility: CLINIC | Age: 28
End: 2023-05-10
Payer: COMMERCIAL

## 2023-05-10 DIAGNOSIS — F41.1 GENERALIZED ANXIETY DISORDER: Primary | ICD-10-CM

## 2023-05-10 NOTE — PROGRESS NOTES
[facility]                                     Progress Note    Patient Name: Amarjit Lin  Date: 5/10/23           Service Type: Individual      Session Start Time: 12:45 pm Session End Time: 12:56     Session Length: 11 minutes (patient arrived  to session late and had to leave early to return to work)    Session #: 5     Attendees: Client attended alone    Service Modality:  Video Visit:      Provider verified identity through the following two step process.  Patient provided:  Patient is known previously to provider    Telemedicine Visit: The patient's condition can be safely assessed and treated via synchronous audio and visual telemedicine encounter.      Reason for Telemedicine Visit: Services only offered telehealth    Originating Site (Patient Location): Patient's home    Distant Site (Provider Location): Provider Remote Setting- Home Office    Consent:  The patient/guardian has verbally consented to: the potential risks and benefits of telemedicine (video visit) versus in person care; bill my insurance or make self-payment for services provided; and responsibility for payment of non-covered services.     Patient would like the video invitation sent by:  My Chart    Mode of Communication:  Video Conference via [facility]    Distant Location (Provider):  Off-site    As the provider I attest to compliance with applicable laws and regulations related to telemedicine.    DATA  Interactive Complexity: No  Crisis: No        Progress Since Last Session (Related to Symptoms / Goals / Homework):   Symptoms: Improving - reports continued improvement from session to session. Patient is using his new skills and finds them helpful.    Homework: Achieved / completed to satisfaction      Episode of Care Goals: Achieved / completed to satisfaction - MAINTENANCE (Working to maintain change, with risk of relapse); Intervened by continuing to positively reinforce healthy behavior choice      Current /  Ongoing Stressors and Concerns:   Patient reports continued decrease of anxiety symptoms overall, continued improvement with anxiety surrounding sleep (only needs sleep aid periodically).     Treatment Objective(s) Addressed in This Session:   use thought-stopping strategy daily to reduce intrusive thoughts  Goal: decrease anxiety symptoms     Intervention:   CBT: reviewed homework completed and patient's skills of using thought stopping strategies and meditation. Writer used encouragement and positive statements to help reinforce changes.    Assessments completed prior to visit:  The following assessments were completed by patient for this visit:  none      ASSESSMENT: Current Emotional / Mental Status (status of significant symptoms):   Risk status (Self / Other harm or suicidal ideation)   Patient denies current fears or concerns for personal safety.   Patient denies current or recent suicidal ideation or behaviors.   Patient denies current or recent homicidal ideation or behaviors.   Patient denies current or recent self injurious behavior or ideation.   Patient denies other safety concerns.   Patient reports there has been no change in risk factors since their last session.     Patient reports there has been no change in protective factors since their last session.     Recommended that patient call 911 or go to the local ED should there be a change in any of these risk factors.     Appearance:   Appropriate    Eye Contact:   Good    Psychomotor Behavior: Normal    Attitude:   Cooperative    Orientation:   All   Speech    Rate / Production: Normal     Volume:  Normal    Mood:    Normal   Affect:    Appropriate    Thought Content:  Clear    Thought Form:  Coherent  Logical    Insight:    Good      Medication Review:   No changes to current psychiatric medication(s)     Medication Compliance:   Yes     Changes in Health Issues:   None reported     Chemical Use Review:   Substance Use: Chemical use reviewed, no  "active concerns identified      Tobacco Use: No current tobacco use.      Diagnosis:  1. Generalized anxiety disorder        Collateral Reports Completed:   Not Applicable    PLAN: (Patient Tasks / Therapist Tasks / Other)  Patient will schedule for 2 - 3 weeks. He reports significant improvement and is in the action/maintenance stage of change. Patient will continue to practice using skills.     Sonal Grey, LPC  5/10/23  Note reviewed and clinical supervision by Michell Blunt, MSW, [facility] 5/11/2023                                                          ______________________________________________________________________    Individual Treatment Plan    Patient's Name: Amarjit Lin  YOB: 1995    Date of Creation: 4/19/23  Date Treatment Plan Last Reviewed/Revised: 4/19/23    DSM5 Diagnoses: 300.02 (F41.1) Generalized Anxiety Disorder  Psychosocial / Contextual Factors: patient grew up in a high performance/expectation family.   PROMIS (reviewed every 90 days):   The following assessments were completed by patient for this visit:  PROMIS 10-Global Health (only subscores and total score):       3/15/2023     8:23 AM   PROMIS-10 Scores Only   Global Mental Health Score 7   Global Physical Health Score 13   PROMIS TOTAL - SUBSCORES 20       Referral / Collaboration:  Not indicated at this time. PCP as needed..    Anticipated number of session for this episode of care: 9-12 sessions  Anticipation frequency of session: Every other week  Anticipated Duration of each session: 38-52 minutes  Treatment plan will be reviewed in 90 days or when goals have been changed.       MeasurableTreatment Goal(s) related to diagnosis / functional impairment(s)  Goal 1: Patient will decrease anxiety symptoms.    I will know I've met my goal when my joe score is lower: on 3/29/23 it was a 13, goal is reached when it is a 6.\"      2/14/2023    10:42 AM 3/15/2023     8:19 AM 3/29/2023     4:31 PM   JOE-7 SCORE "   Total Score 11 (moderate anxiety) 18 (severe anxiety) 13 (moderate anxiety)   Total Score 11 18        Objective #A (Patient Action)    Patient will use thought-stopping strategy daily to reduce intrusive thoughts.  Status: Continued - Date(s):4/19/23     Intervention(s)  Therapist will teach CBT and thought stopping strategies, and assign homework.    Objective #B  Patient will identify his initial signs or symptoms of anxiety, fears/thoughts that contribute to feeling anxious.   Status: Continued - Date(s):4/19/23     Intervention(s)  Therapist will use encouragement and validation to help patient identify the signs of anxiety he experiences, and to help patient express his fears/thoughts related to anxiety.    Objective #C  Patient will use meditation to help decrease anxiety symptoms.  Status: Status: Continued - Date(s):4/19/23     Intervention(s)  Therapist will teach meditation skills and assign homework.        Patient has not reviewed nor agreed to the above plan.      Sonal Grey LPC  April 19, 2023  Treatment plan reviewed and clinical supervision by FLORA Sosa, [facility] 5/5/2023

## 2023-05-22 DIAGNOSIS — F41.1 GENERALIZED ANXIETY DISORDER: ICD-10-CM

## 2023-05-22 NOTE — TELEPHONE ENCOUNTER
Medication Request  Medication name:   escitalopram (LEXAPRO) 20 MG tablet  hydrOXYzine (VISTARIL) 25 MG capsule  Requested Pharmacy: [facility] #[id number]  When was patient last seen for this?:  02/21/23  Patient offered appointment:  GENE, pharmacy request.  Okay to leave a detailed message: no      Pharmacy requesting 90-day supplies for these fills.

## 2023-05-23 NOTE — TELEPHONE ENCOUNTER
"Former patient of [facility]& has not established care with another provider.  Please assign refill request to covering provider per clinic standard process.    Routing refill request to provider for review/approval because:  No PCP    lexapro  Last Written Prescription Date:  3/31/23  Last Fill Quantity: 30,  # refills: 1   Last office visit provider:  2/21/23     hydroxyzine  Last Written Prescription Date:  3/31/23  Last Fill Quantity: 60,  # refills: 1   Last office visit provider:  2/21/23       Requested Prescriptions   Pending Prescriptions Disp Refills     escitalopram (LEXAPRO) 20 MG tablet 90 tablet 0     Sig: Take 1 tablet (20 mg) by mouth daily       SSRIs Protocol Passed - 5/23/2023 12:52 AM        Passed - Recent (12 mo) or future (30 days) visit within the authorizing provider's specialty     Patient has had an office visit with the authorizing provider or a provider within the authorizing providers department within the previous 12 mos or has a future within next 30 days. See \"Patient Info\" tab in inbasket, or \"Choose Columns\" in Meds & Orders section of the refill encounter.              Passed - Medication is active on med list        Passed - Patient is age 18 or older           hydrOXYzine (VISTARIL) 25 MG capsule 180 capsule 0     Sig: Take 2 capsules (50 mg) by mouth 3 times daily as needed for itching or anxiety       Antihistamines Protocol Passed - 5/23/2023 12:53 AM        Passed - Recent (12 mo) or future (30 days) visit within the authorizing provider's specialty     Patient has had an office visit with the authorizing provider or a provider within the authorizing providers department within the previous 12 mos or has a future within next 30 days. See \"Patient Info\" tab in inbasket, or \"Choose Columns\" in Meds & Orders section of the refill encounter.              Passed - Patient is age 3 or older     Apply age and/or weight-based dosing for peds patients age 3 and older.    Forward " request to provider for patients under the age of 3.          Passed - Medication is active on med list             Ashley Muniz RN 05/23/23 12:57 AM

## 2023-05-24 RX ORDER — ESCITALOPRAM OXALATE 20 MG/1
20 TABLET ORAL DAILY
Qty: 90 TABLET | Refills: 0 | Status: SHIPPED | OUTPATIENT
Start: 2023-05-24

## 2023-05-24 RX ORDER — HYDROXYZINE PAMOATE 25 MG/1
50 CAPSULE ORAL 3 TIMES DAILY PRN
Qty: 180 CAPSULE | Refills: 1 | Status: SHIPPED | OUTPATIENT
Start: 2023-05-24

## 2023-05-24 RX ORDER — ESCITALOPRAM OXALATE 20 MG/1
20 TABLET ORAL DAILY
Qty: 90 TABLET | Refills: 1 | Status: SHIPPED | OUTPATIENT
Start: 2023-05-24 | End: 2023-05-24

## 2023-05-24 NOTE — TELEPHONE ENCOUNTER
Left message to call back for: patient x1  Information to relay to patient: Relay Provider message below and help schedule establish-care.

## 2023-05-24 NOTE — TELEPHONE ENCOUNTER
Patient is getting refills from two separate providers specific to mental health. I will provide a 90 day supply currently, but will not provide future refills as patient needs to establish with a PCP in order to receive comprehensive care.

## 2023-05-24 NOTE — TELEPHONE ENCOUNTER
"    Last Written Prescription Date:  3/31/2023  Last Fill Quantity: 30,  # refills: 1   Last office visit provider:  2/21/2023     Last Written Prescription Date:  3/31/2023  Last Fill Quantity: 60,  # refills: 1   Last office visit provider:  2/21/2023     Requested Prescriptions   Pending Prescriptions Disp Refills     escitalopram (LEXAPRO) 20 MG tablet 90 tablet 0     Sig: Take 1 tablet (20 mg) by mouth daily       SSRIs Protocol Passed - 5/24/2023 10:46 AM        Passed - Recent (12 mo) or future (30 days) visit within the authorizing provider's specialty     Patient has had an office visit with the authorizing provider or a provider within the authorizing providers department within the previous 12 mos or has a future within next 30 days. See \"Patient Info\" tab in inbasket, or \"Choose Columns\" in Meds & Orders section of the refill encounter.              Passed - Medication is active on med list        Passed - Patient is age 18 or older           hydrOXYzine (VISTARIL) 25 MG capsule 180 capsule 0     Sig: Take 2 capsules (50 mg) by mouth 3 times daily as needed for itching or anxiety       Antihistamines Protocol Passed - 5/24/2023 10:46 AM        Passed - Recent (12 mo) or future (30 days) visit within the authorizing provider's specialty     Patient has had an office visit with the authorizing provider or a provider within the authorizing providers department within the previous 12 mos or has a future within next 30 days. See \"Patient Info\" tab in inbasket, or \"Choose Columns\" in Meds & Orders section of the refill encounter.              Passed - Patient is age 3 or older     Apply age and/or weight-based dosing for peds patients age 3 and older.    Forward request to provider for patients under the age of 3.          Passed - Medication is active on med list             Gilda Mireles RN 05/24/23 10:47 AM  "

## 2023-08-31 ENCOUNTER — FCC EXTENDED DOCUMENTATION (OUTPATIENT)
Dept: PSYCHOLOGY | Facility: CLINIC | Age: 28
End: 2023-08-31
Payer: COMMERCIAL

## 2023-08-31 NOTE — PROGRESS NOTES
Discharge Summary  Multiple Sessions    Client Name: Amarjit Lin MRN#: [id number] YOB: 1995      Intake / Discharge Date: 8/31/23 discharge date      DSM5 Diagnoses: (Sustained by DSM5 Criteria Listed Above)  Diagnoses: Generalized Anxiety Disorder  Psychosocial & Contextual Factors: grew up in a high-expectation household          Presenting Concern:  Patient presented for counseling with generalized anxiety symptoms, and it was affecting his functioning.      Reason for Discharge:  Client did not return      Disposition at Time of Last Encounter:   Comments:   Patient denied SI, denied SIB, symptoms were improving.     Risk Management:   Client denies a history of suicidal ideation, suicide attempts, self-injurious behavior, homicidal ideation, homicidal behavior, and and other safety concerns  Recommended that patient call 911 or go to the local ED should there be a change in any of these risk factors.      Referred To:  Writer is transferring departments.  Patient may call the counseling department to re-establish care with a new provider.          Sonal Grey MA, [facility] 8/31/2023

## 2023-10-15 ENCOUNTER — HEALTH MAINTENANCE LETTER (OUTPATIENT)
Age: 28
End: 2023-10-15

## 2024-12-04 ENCOUNTER — OFFICE VISIT (OUTPATIENT)
Dept: URGENT CARE | Facility: URGENT CARE | Age: 29
End: 2024-12-04
Payer: COMMERCIAL

## 2024-12-04 ENCOUNTER — ANCILLARY PROCEDURE (OUTPATIENT)
Dept: GENERAL RADIOLOGY | Facility: CLINIC | Age: 29
End: 2024-12-04
Attending: PHYSICIAN ASSISTANT
Payer: COMMERCIAL

## 2024-12-04 ENCOUNTER — ALLIED HEALTH/NURSE VISIT (OUTPATIENT)
Dept: FAMILY MEDICINE | Facility: CLINIC | Age: 29
End: 2024-12-04
Payer: COMMERCIAL

## 2024-12-04 VITALS
TEMPERATURE: 98.6 F | SYSTOLIC BLOOD PRESSURE: 120 MMHG | HEART RATE: 87 BPM | DIASTOLIC BLOOD PRESSURE: 76 MMHG | OXYGEN SATURATION: 98 % | RESPIRATION RATE: 14 BRPM

## 2024-12-04 VITALS
HEART RATE: 84 BPM | DIASTOLIC BLOOD PRESSURE: 83 MMHG | SYSTOLIC BLOOD PRESSURE: 141 MMHG | TEMPERATURE: 98.7 F | OXYGEN SATURATION: 99 %

## 2024-12-04 DIAGNOSIS — R53.83 OTHER FATIGUE: Primary | ICD-10-CM

## 2024-12-04 DIAGNOSIS — R20.0 LOWER EXTREMITY NUMBNESS: Primary | ICD-10-CM

## 2024-12-04 DIAGNOSIS — R53.83 OTHER FATIGUE: ICD-10-CM

## 2024-12-04 LAB
ANION GAP SERPL CALCULATED.3IONS-SCNC: 12 MMOL/L (ref 7–15)
BUN SERPL-MCNC: 11 MG/DL (ref 6–20)
CALCIUM SERPL-MCNC: 9.4 MG/DL (ref 8.8–10.4)
CHLORIDE SERPL-SCNC: 103 MMOL/L (ref 98–107)
CREAT SERPL-MCNC: 0.93 MG/DL (ref 0.67–1.17)
EGFRCR SERPLBLD CKD-EPI 2021: >90 ML/MIN/1.73M2
ERYTHROCYTE [DISTWIDTH] IN BLOOD BY AUTOMATED COUNT: 12 % (ref 10–15)
GLUCOSE SERPL-MCNC: 103 MG/DL (ref 70–99)
HCO3 SERPL-SCNC: 26 MMOL/L (ref 22–29)
HCT VFR BLD AUTO: 40.2 % (ref 40–53)
HGB BLD-MCNC: 14.1 G/DL (ref 13.3–17.7)
MCH RBC QN AUTO: 30.3 PG (ref 26.5–33)
MCHC RBC AUTO-ENTMCNC: 35.1 G/DL (ref 31.5–36.5)
MCV RBC AUTO: 87 FL (ref 78–100)
MONOCYTES NFR BLD AUTO: NEGATIVE %
PLATELET # BLD AUTO: 268 10E3/UL (ref 150–450)
POTASSIUM SERPL-SCNC: 4.1 MMOL/L (ref 3.4–5.3)
RBC # BLD AUTO: 4.65 10E6/UL (ref 4.4–5.9)
SODIUM SERPL-SCNC: 141 MMOL/L (ref 135–145)
WBC # BLD AUTO: 6.8 10E3/UL (ref 4–11)

## 2024-12-04 PROCEDURE — 86308 HETEROPHILE ANTIBODY SCREEN: CPT | Performed by: PHYSICIAN ASSISTANT

## 2024-12-04 PROCEDURE — 80048 BASIC METABOLIC PNL TOTAL CA: CPT | Performed by: PHYSICIAN ASSISTANT

## 2024-12-04 PROCEDURE — 99214 OFFICE O/P EST MOD 30 MIN: CPT | Performed by: PHYSICIAN ASSISTANT

## 2024-12-04 PROCEDURE — 85027 COMPLETE CBC AUTOMATED: CPT | Performed by: PHYSICIAN ASSISTANT

## 2024-12-04 PROCEDURE — 71046 X-RAY EXAM CHEST 2 VIEWS: CPT | Mod: TC | Performed by: RADIOLOGY

## 2024-12-04 PROCEDURE — 36415 COLL VENOUS BLD VENIPUNCTURE: CPT | Performed by: PHYSICIAN ASSISTANT

## 2024-12-04 NOTE — PATIENT INSTRUCTIONS
Take a few days to recover.     Follow up if worsening    Follow up with a Primary care clinic in 7-10 days

## 2024-12-04 NOTE — PROGRESS NOTES
SUBJECTIVE:   Amarjit Lin is a 29 year old male presenting with a chief complaint of fever for 2 days with viral symptoms twice in the last week. No feels fatigued.  Most noticeable this morning, legs felt almost asleep after is commute to work. Now swlowely improving and nearly back to normal after a few hours.     No past medical history on file.  Current Outpatient Medications   Medication Sig Dispense Refill    escitalopram (LEXAPRO) 20 MG tablet Take 1 tablet (20 mg) by mouth daily (Patient not taking: Reported on 2024) 90 tablet 0    hydrOXYzine (VISTARIL) 25 MG capsule Take 2 capsules (50 mg) by mouth 3 times daily as needed for itching or anxiety (Patient not taking: Reported on 2024) 180 capsule 1    propranolol (INDERAL) 20 MG tablet TAKE 1 TABLET (20 MG) BY MOUTH 3 TIMES DAILY AS NEEDED (ANXIETY) (Patient not taking: Reported on 2024) 60 tablet 0    traZODone (DESYREL) 50 MG tablet TAKE 1-2 TABLETS BY MOUTH AT BEDTIME. (Patient not taking: Reported on 2024) 30 tablet 0     Social History     Tobacco Use    Smoking status: Former     Current packs/day: 0.00     Types: Cigarettes     Quit date: 2018     Years since quittin.8     Passive exposure: Never    Smokeless tobacco: Never   Substance Use Topics    Alcohol use: Not on file       ROS:  Review of systems negative except as stated above.    OBJECTIVE:  /76   Pulse 87   Temp 98.6  F (37  C) (Oral)   Resp 14   SpO2 98%   GENERAL APPEARANCE: healthy, alert and no distress  HENT: ear canals and TM's normal.  Nose and mouth without ulcers, erythema or lesions  NECK: supple, nontender, no lymphadenopathy  RESP: lungs clear to auscultation - no rales, rhonchi or wheezes  CV: regular rates and rhythm, normal S1 S2, no murmur noted  NEURO: Normal strength and tone, sensory exam grossly normal,  normal speech and mentation  SKIN: no suspicious lesions or rashes      Results for orders placed or performed in visit on  12/04/24   CBC with platelets     Status: Normal   Result Value Ref Range    WBC Count 6.8 4.0 - 11.0 10e3/uL    RBC Count 4.65 4.40 - 5.90 10e6/uL    Hemoglobin 14.1 13.3 - 17.7 g/dL    Hematocrit 40.2 40.0 - 53.0 %    MCV 87 78 - 100 fL    MCH 30.3 26.5 - 33.0 pg    MCHC 35.1 31.5 - 36.5 g/dL    RDW 12.0 10.0 - 15.0 %    Platelet Count 268 150 - 450 10e3/uL   Mononucleosis screen     Status: Normal   Result Value Ref Range    Mononucleosis Screen Negative Negative       ASSESSMENT:  (R53.83) Other fatigue  (primary encounter diagnosis)  Plan: CBC with platelets, Basic metabolic panel  (Ca,        Cl, CO2, Creat, Gluc, K, Na, BUN), XR Chest 2         Views, Mononucleosis screen          Patient Instructions   Take a few days to recover.     Follow up if worsening    Follow up with a Primary care clinic in 7-10 days

## 2024-12-04 NOTE — PROGRESS NOTES
Patient is arrived minutes before UC opened, appt note says rapid rooming. RN assessed patient.     S-(situation): He reports this AM that he felt some mild loss of sensation in legs (numbness)    B-(background): Had some respiratory symptoms last week before thanksgiving, no meds taken, he gradually felt better, had a one time fever Sunday night (12/1), took OTC Naproxen, afebrile since Monday afternoon 12/2    A-(assessment): He states the numbness in his legs are starting to get better for the day, denies pins and needles nor pain, but thought better get checked as he feels this is part of viral fatigue when he got sick last week, able to ambulate, denies chest pain, denies SOB. BP (!) 141/83 (BP Location: Right arm, Patient Position: Sitting, Cuff Size: Adult Regular)   Pulse 84   Temp 98.7  F (37.1  C) (Oral)   SpO2 99% \    R-(recommendations): RN assisted patient to UC.

## 2024-12-08 ENCOUNTER — HEALTH MAINTENANCE LETTER (OUTPATIENT)
Age: 29
End: 2024-12-08